# Patient Record
Sex: FEMALE | Race: BLACK OR AFRICAN AMERICAN | NOT HISPANIC OR LATINO | Employment: FULL TIME | URBAN - METROPOLITAN AREA
[De-identification: names, ages, dates, MRNs, and addresses within clinical notes are randomized per-mention and may not be internally consistent; named-entity substitution may affect disease eponyms.]

---

## 2017-05-22 ENCOUNTER — OFFICE VISIT (OUTPATIENT)
Dept: FAMILY MEDICINE CLINIC | Facility: CLINIC | Age: 47
End: 2017-05-22

## 2017-05-22 ENCOUNTER — HOSPITAL ENCOUNTER (OUTPATIENT)
Dept: GENERAL RADIOLOGY | Facility: HOSPITAL | Age: 47
Discharge: HOME | End: 2017-05-22
Admitting: NURSE PRACTITIONER

## 2017-05-22 VITALS
SYSTOLIC BLOOD PRESSURE: 140 MMHG | OXYGEN SATURATION: 99 % | HEIGHT: 69 IN | BODY MASS INDEX: 32.88 KG/M2 | HEART RATE: 87 BPM | DIASTOLIC BLOOD PRESSURE: 102 MMHG | WEIGHT: 222 LBS

## 2017-05-22 DIAGNOSIS — M53.3 PAIN IN THE COCCYX: Primary | ICD-10-CM

## 2017-05-22 DIAGNOSIS — I10 ESSENTIAL HYPERTENSION: ICD-10-CM

## 2017-05-22 DIAGNOSIS — M53.3 PAIN IN THE COCCYX: ICD-10-CM

## 2017-05-22 PROCEDURE — 99203 OFFICE O/P NEW LOW 30 MIN: CPT | Performed by: NURSE PRACTITIONER

## 2017-05-22 PROCEDURE — 72220 X-RAY EXAM SACRUM TAILBONE: CPT

## 2017-05-22 RX ORDER — LISINOPRIL AND HYDROCHLOROTHIAZIDE 20; 12.5 MG/1; MG/1
1 TABLET ORAL DAILY
Qty: 30 TABLET | Refills: 1 | Status: SHIPPED | OUTPATIENT
Start: 2017-05-22 | End: 2017-06-29

## 2017-06-22 ENCOUNTER — OFFICE VISIT (OUTPATIENT)
Dept: FAMILY MEDICINE CLINIC | Facility: CLINIC | Age: 47
End: 2017-06-22

## 2017-06-22 VITALS
DIASTOLIC BLOOD PRESSURE: 88 MMHG | WEIGHT: 217 LBS | HEIGHT: 69 IN | HEART RATE: 75 BPM | SYSTOLIC BLOOD PRESSURE: 138 MMHG | OXYGEN SATURATION: 99 % | BODY MASS INDEX: 32.14 KG/M2

## 2017-06-22 DIAGNOSIS — A60.09 HERPES GENITALIS IN WOMEN: ICD-10-CM

## 2017-06-22 DIAGNOSIS — I10 ESSENTIAL HYPERTENSION: ICD-10-CM

## 2017-06-22 DIAGNOSIS — Z00.00 ROUTINE ADULT HEALTH MAINTENANCE: Primary | ICD-10-CM

## 2017-06-22 LAB
ALBUMIN SERPL-MCNC: 3.9 G/DL (ref 3.5–5.2)
ALBUMIN/GLOB SERPL: 1.1 G/DL
ALP SERPL-CCNC: 87 U/L (ref 39–117)
ALT SERPL-CCNC: 21 U/L (ref 1–33)
AST SERPL-CCNC: 18 U/L (ref 1–32)
BASOPHILS # BLD AUTO: 0.02 10*3/MM3 (ref 0–0.2)
BASOPHILS NFR BLD AUTO: 0.3 % (ref 0–1.5)
BILIRUB SERPL-MCNC: 0.2 MG/DL (ref 0.1–1.2)
BUN SERPL-MCNC: 8 MG/DL (ref 6–20)
BUN/CREAT SERPL: 11.3 (ref 7–25)
CALCIUM SERPL-MCNC: 9.8 MG/DL (ref 8.6–10.5)
CHLORIDE SERPL-SCNC: 102 MMOL/L (ref 98–107)
CHOLEST SERPL-MCNC: 246 MG/DL (ref 0–200)
CO2 SERPL-SCNC: 26 MMOL/L (ref 22–29)
CREAT SERPL-MCNC: 0.71 MG/DL (ref 0.57–1)
EOSINOPHIL # BLD AUTO: 0.27 10*3/MM3 (ref 0–0.7)
EOSINOPHIL NFR BLD AUTO: 3.8 % (ref 0.3–6.2)
ERYTHROCYTE [DISTWIDTH] IN BLOOD BY AUTOMATED COUNT: 14.7 % (ref 11.7–13)
GLOBULIN SER CALC-MCNC: 3.7 GM/DL
GLUCOSE SERPL-MCNC: 107 MG/DL (ref 65–99)
HBA1C MFR BLD: 6.3 % (ref 4.8–5.6)
HCT VFR BLD AUTO: 38.9 % (ref 35.6–45.5)
HDLC SERPL-MCNC: 36 MG/DL (ref 40–60)
HGB BLD-MCNC: 12.8 G/DL (ref 11.9–15.5)
IMM GRANULOCYTES # BLD: 0 10*3/MM3 (ref 0–0.03)
IMM GRANULOCYTES NFR BLD: 0 % (ref 0–0.5)
LDLC SERPL CALC-MCNC: 177 MG/DL (ref 0–100)
LDLC/HDLC SERPL: 4.92 {RATIO}
LYMPHOCYTES # BLD AUTO: 2.42 10*3/MM3 (ref 0.9–4.8)
LYMPHOCYTES NFR BLD AUTO: 34.1 % (ref 19.6–45.3)
MCH RBC QN AUTO: 27.8 PG (ref 26.9–32)
MCHC RBC AUTO-ENTMCNC: 32.9 G/DL (ref 32.4–36.3)
MCV RBC AUTO: 84.4 FL (ref 80.5–98.2)
MONOCYTES # BLD AUTO: 0.9 10*3/MM3 (ref 0.2–1.2)
MONOCYTES NFR BLD AUTO: 12.7 % (ref 5–12)
NEUTROPHILS # BLD AUTO: 3.48 10*3/MM3 (ref 1.9–8.1)
NEUTROPHILS NFR BLD AUTO: 49.1 % (ref 42.7–76)
PLATELET # BLD AUTO: 422 10*3/MM3 (ref 140–500)
POTASSIUM SERPL-SCNC: 4.3 MMOL/L (ref 3.5–5.2)
PROT SERPL-MCNC: 7.6 G/DL (ref 6–8.5)
RBC # BLD AUTO: 4.61 10*6/MM3 (ref 3.9–5.2)
SODIUM SERPL-SCNC: 140 MMOL/L (ref 136–145)
TRIGL SERPL-MCNC: 165 MG/DL (ref 0–150)
TSH SERPL DL<=0.005 MIU/L-ACNC: 1.13 MIU/ML (ref 0.27–4.2)
VLDLC SERPL CALC-MCNC: 33 MG/DL (ref 5–40)
WBC # BLD AUTO: 7.09 10*3/MM3 (ref 4.5–10.7)

## 2017-06-22 PROCEDURE — 99396 PREV VISIT EST AGE 40-64: CPT | Performed by: NURSE PRACTITIONER

## 2017-06-22 RX ORDER — VALACYCLOVIR HYDROCHLORIDE 500 MG/1
500 TABLET, FILM COATED ORAL DAILY
Qty: 90 TABLET | Refills: 3 | Status: SHIPPED | OUTPATIENT
Start: 2017-06-22 | End: 2020-07-09 | Stop reason: SDUPTHER

## 2017-06-22 NOTE — PROGRESS NOTES
"Preventive Exam    History of Present Illness: Jacqueline is here for check up and review of routine health maintenance. She states she is doing well and has no concerns. Pt was started on lisinopril and took 5 days of the medication and wants to lose weight and work on diet and exercise. Smoking 5 cigg a day. Pt has lost 5 pounds and is doing weight watchers.   Pt has a history of genital herpes and wants to take daily medication.     REVIEW OF SYSTEMS  Constitutional: Negative.    HENT: Negative.    Eyes: Negative.    Respiratory: Negative.    Cardiovascular: Negative.    Gastrointestinal: Negative.    Endocrine: Negative.    Genitourinary: Negative.    Musculoskeletal: Negative.  Skin: Negative.    Allergic/Immunologic: Negative.    Neurological: Negative.    Hematological: Negative.    Psychiatric/Behavioral: Negative.    All other systems reviewed and are negative.          PHYSICAL EXAM    Vitals:    06/22/17 1257   BP: 138/88   Pulse: 75   SpO2: 99%   Weight: 217 lb (98.4 kg)   Height: 69\" (175.3 cm)     GENERAL: alert and oriented, afebrile and vital signs stable  HEENT: oral mucosa moist, PEERLA, EOM, conjunctiva normal  No cervical adenopathy  LUNGS: clear to ascultation bilaterally, no rales, ronchi or wheezing  HEART: RRR S1 S2 without murmers, thrills, rubs or gallops  CHEST WALL: within normal limits, no tenderness  BREAST EXAM: No masses, skin changes, tenderness or discharge noted  ABDOMEN: WNL. Normal BS.  EXTREMITIES: No clubbing, cyanosis or edema noted. Normal Pulses.  SKIN: warm, dry, no rashes noted  NEURO: CN II- XII grossly intact    ASSESSMENT AND PLAN  Problem List Items Addressed This Visit     None      Visit Diagnoses     Routine adult health maintenance    -  Primary    Relevant Orders    CBC & Differential    Comprehensive Metabolic Panel    Lipid Panel With LDL / HDL Ratio    TSH    Hemoglobin A1c    Mammo Screening Bilateral With CAD    Essential hypertension        Herpes genitalis in " women        Relevant Medications    valACYclovir (VALTREX) 500 MG tablet        Routine health maintenance reviewed and discussed with Jacqueline.    .  Orders Placed This Encounter   Procedures   • Mammo Screening Bilateral With CAD     Order Specific Question:   Reason for Exam:     Answer:   screening     Order Specific Question:   Patient Pregnant     Answer:   No   • Comprehensive Metabolic Panel   • Lipid Panel With LDL / HDL Ratio   • TSH   • Hemoglobin A1c   • CBC & Differential     No Follow-up on file.

## 2017-06-27 ENCOUNTER — TELEPHONE (OUTPATIENT)
Dept: FAMILY MEDICINE CLINIC | Facility: CLINIC | Age: 47
End: 2017-06-27

## 2017-06-29 ENCOUNTER — OFFICE VISIT (OUTPATIENT)
Dept: FAMILY MEDICINE CLINIC | Facility: CLINIC | Age: 47
End: 2017-06-29

## 2017-06-29 VITALS
SYSTOLIC BLOOD PRESSURE: 140 MMHG | DIASTOLIC BLOOD PRESSURE: 98 MMHG | HEIGHT: 69 IN | OXYGEN SATURATION: 99 % | HEART RATE: 65 BPM | WEIGHT: 213 LBS | BODY MASS INDEX: 31.55 KG/M2

## 2017-06-29 DIAGNOSIS — F32.9 REACTIVE DEPRESSION: ICD-10-CM

## 2017-06-29 DIAGNOSIS — R55 SYNCOPE AND COLLAPSE: Primary | ICD-10-CM

## 2017-06-29 DIAGNOSIS — Z09 HOSPITAL DISCHARGE FOLLOW-UP: ICD-10-CM

## 2017-06-29 DIAGNOSIS — F41.9 ANXIETY: ICD-10-CM

## 2017-06-29 PROCEDURE — 99213 OFFICE O/P EST LOW 20 MIN: CPT | Performed by: NURSE PRACTITIONER

## 2017-06-29 RX ORDER — HYDROCHLOROTHIAZIDE 25 MG/1
TABLET ORAL
Refills: 0 | COMMUNITY
Start: 2017-06-25 | End: 2018-03-28

## 2017-06-29 NOTE — PATIENT INSTRUCTIONS
Orthostatic Hypotension  Orthostatic hypotension is a sudden drop in blood pressure. It happens when you quickly stand up from a seated or lying position. You may feel dizzy or light-headed. This can last for just a few seconds or for up to a few minutes. It is usually not a serious problem. However, if this happens frequently or gets worse, it can be a sign of something more serious.  CAUSES   Different things can cause orthostatic hypotension, including:   · Loss of body fluids (dehydration).  · Medicines that lower blood pressure.  · Sudden changes in posture, such as standing up quickly after you have been sitting or lying down.  · Taking too much of your medicine.  SIGNS AND SYMPTOMS   · Light-headedness or dizziness.    · Fainting or near-fainting.    · A fast heart rate.    · Weakness.    · Feeling tired (fatigue).    DIAGNOSIS   Your health care provider may do several things to help diagnose your condition and identify the cause. These may include:   · Taking a medical history and doing a physical exam.  · Checking your blood pressure. Your health care provider will check your blood pressure when you are:    Lying down.    Sitting.    Standing.  · Using tilt table testing. In this test, you lie down on a table that moves from a lying position to a standing position. You will be strapped onto the table. This test monitors your blood pressure and heart rate when you are in different positions.  TREATMENT   Treatment will vary depending on the cause. Possible treatments include:   · Changing the dosage of your medicines.   · Wearing compression stockings on your lower legs.  · Standing up slowly after sitting or lying down.  · Eating more salt.  · Eating frequent, small meals.  · In some cases, getting IV fluids.  · Taking medicine to enhance fluid retention.  HOME CARE INSTRUCTIONS  · Only take over-the-counter or prescription medicines as directed by your health care provider.    Follow your health care  provider's instructions for changing the dosage of your current medicines.    Do not stop or adjust your medicine on your own.  · Stand up slowly after sitting or lying down. This allows your body to adjust to the different position.  · Wear compression stockings as directed.  · Eat extra salt as directed.    Do not add extra salt to your diet unless directed to by your health care provider.  · Eat frequent, small meals.  · Avoid standing suddenly after eating.  · Avoid hot showers or excessive heat as directed by your health care provider.  · Keep all follow-up appointments.  SEEK MEDICAL CARE IF:  · You continue to feel dizzy or light-headed after standing.  · You feel groggy or confused.  · You feel cold, clammy, or sick to your stomach (nauseous).  · You have blurred vision.  · You feel short of breath.  SEEK IMMEDIATE MEDICAL CARE IF:   · You faint after standing.  · You have chest pain.   · You have difficulty breathing.    · You lose feeling or movement in your arms or legs.    · You have slurred speech or difficulty talking, or you are unable to talk.    MAKE SURE YOU:   · Understand these instructions.  · Will watch your condition.  · Will get help right away if you are not doing well or get worse.     This information is not intended to replace advice given to you by your health care provider. Make sure you discuss any questions you have with your health care provider.     Document Released: 12/08/2003 Document Revised: 04/10/2017 Document Reviewed: 10/10/2014  [employer] Interactive Patient Education ©2017 [employer]

## 2017-06-29 NOTE — PROGRESS NOTES
Jacqueline Mcdowell is a 46 y.o. female.Patient states that last Saturday she went to [facility], she started feeling like she needed a rest and while she was walking away she passed out. She had not drank anything that day. She was seen in the ER. She was dx with dehydration. Patient states that she does have intermittent dizzy spells. Patient has not been taking Lisinopril.     Reviewed records from hospital admission and pt was discharged with normal echocardiogram and started on HCTZ. Pt is tearful and admits to relationship problems with her long term boyfriend.   Records from hospital admission reviewed. Pt is taking the HCTZ and has not had any dizziness since admission.   Patient also states that she has had depression since May. Denies and SI/HI.   Seen 06/29/2017    Assessment/Plan   Problem List Items Addressed This Visit     None      Visit Diagnoses     Syncope and collapse    -  Primary    Hospital discharge follow-up        Anxiety        Relevant Orders    Ambulatory Referral to Psychiatry    Reactive depression                 No Follow-up on file.  Patient Instructions   Orthostatic Hypotension  Orthostatic hypotension is a sudden drop in blood pressure. It happens when you quickly stand up from a seated or lying position. You may feel dizzy or light-headed. This can last for just a few seconds or for up to a few minutes. It is usually not a serious problem. However, if this happens frequently or gets worse, it can be a sign of something more serious.  CAUSES   Different things can cause orthostatic hypotension, including:   · Loss of body fluids (dehydration).  · Medicines that lower blood pressure.  · Sudden changes in posture, such as standing up quickly after you have been sitting or lying down.  · Taking too much of your medicine.  SIGNS AND SYMPTOMS   · Light-headedness or dizziness.    · Fainting or near-fainting.    · A fast heart rate.    · Weakness.    · Feeling tired (fatigue).    DIAGNOSIS   Your  health care provider may do several things to help diagnose your condition and identify the cause. These may include:   · Taking a medical history and doing a physical exam.  · Checking your blood pressure. Your health care provider will check your blood pressure when you are:    Lying down.    Sitting.    Standing.  · Using tilt table testing. In this test, you lie down on a table that moves from a lying position to a standing position. You will be strapped onto the table. This test monitors your blood pressure and heart rate when you are in different positions.  TREATMENT   Treatment will vary depending on the cause. Possible treatments include:   · Changing the dosage of your medicines.   · Wearing compression stockings on your lower legs.  · Standing up slowly after sitting or lying down.  · Eating more salt.  · Eating frequent, small meals.  · In some cases, getting IV fluids.  · Taking medicine to enhance fluid retention.  HOME CARE INSTRUCTIONS  · Only take over-the-counter or prescription medicines as directed by your health care provider.    Follow your health care provider's instructions for changing the dosage of your current medicines.    Do not stop or adjust your medicine on your own.  · Stand up slowly after sitting or lying down. This allows your body to adjust to the different position.  · Wear compression stockings as directed.  · Eat extra salt as directed.    Do not add extra salt to your diet unless directed to by your health care provider.  · Eat frequent, small meals.  · Avoid standing suddenly after eating.  · Avoid hot showers or excessive heat as directed by your health care provider.  · Keep all follow-up appointments.  SEEK MEDICAL CARE IF:  · You continue to feel dizzy or light-headed after standing.  · You feel groggy or confused.  · You feel cold, clammy, or sick to your stomach (nauseous).  · You have blurred vision.  · You feel short of breath.  SEEK IMMEDIATE MEDICAL CARE IF:  "  · You faint after standing.  · You have chest pain.   · You have difficulty breathing.    · You lose feeling or movement in your arms or legs.    · You have slurred speech or difficulty talking, or you are unable to talk.    MAKE SURE YOU:   · Understand these instructions.  · Will watch your condition.  · Will get help right away if you are not doing well or get worse.     This information is not intended to replace advice given to you by your health care provider. Make sure you discuss any questions you have with your health care provider.     Document Released: 12/08/2003 Document Revised: 04/10/2017 Document Reviewed: 10/10/2014  [employer] Interactive Patient Education ©2017 [employer]        Vitals:    06/29/17 1100   BP: 140/98   Pulse: 65   SpO2: 99%   Weight: 213 lb (96.6 kg)   Height: 69\" (175.3 cm)     Body mass index is 31.45 kg/(m^2).    Subjective     Chief Complaint   Patient presents with   • Depression   • Follow-up     Social History   Substance Use Topics   • Smoking status: Current Every Day Smoker     Packs/day: 0.25     Start date: 4/15/2017   • Smokeless tobacco: Not on file   • Alcohol use No       History of Present Illness     The following portions of the patient's history were reviewed and updated as appropriate:PMHroutine: Social history , Allergies, Current Medications, Active Problem List and Health Maintenance    Review of Systems   Neurological: Positive for dizziness.       Objective   Physical Exam   Constitutional: She is oriented to person, place, and time. She appears well-developed and well-nourished. No distress.   HENT:   Head: Normocephalic and atraumatic.   Right Ear: External ear normal.   Left Ear: External ear normal.   Nose: Nose normal.   Eyes: Conjunctivae and EOM are normal. Pupils are equal, round, and reactive to light.   Neck: Normal range of motion. Neck supple.   Cardiovascular: Normal rate and regular rhythm.    No murmur heard.  Pulmonary/Chest: Effort " normal and breath sounds normal.   Musculoskeletal: Normal range of motion.   Lymphadenopathy:     She has no cervical adenopathy.   Neurological: She is alert and oriented to person, place, and time. No cranial nerve deficit. Coordination normal.   Skin: Skin is warm and dry.   Psychiatric: She has a normal mood and affect. Her behavior is normal. Judgment and thought content normal.   Nursing note and vitals reviewed.    Reviewed Data:  Office Visit on 06/22/2017   Component Date Value Ref Range Status   • WBC 06/22/2017 7.09  4.50 - 10.70 10*3/mm3 Final   • RBC 06/22/2017 4.61  3.90 - 5.20 10*6/mm3 Final   • Hemoglobin 06/22/2017 12.8  11.9 - 15.5 g/dL Final   • Hematocrit 06/22/2017 38.9  35.6 - 45.5 % Final   • MCV 06/22/2017 84.4  80.5 - 98.2 fL Final   • MCH 06/22/2017 27.8  26.9 - 32.0 pg Final   • MCHC 06/22/2017 32.9  32.4 - 36.3 g/dL Final   • RDW 06/22/2017 14.7* 11.7 - 13.0 % Final   • Platelets 06/22/2017 422  140 - 500 10*3/mm3 Final   • Neutrophil Rel % 06/22/2017 49.1  42.7 - 76.0 % Final   • Lymphocyte Rel % 06/22/2017 34.1  19.6 - 45.3 % Final   • Monocyte Rel % 06/22/2017 12.7* 5.0 - 12.0 % Final   • Eosinophil Rel % 06/22/2017 3.8  0.3 - 6.2 % Final   • Basophil Rel % 06/22/2017 0.3  0.0 - 1.5 % Final   • Neutrophils Absolute 06/22/2017 3.48  1.90 - 8.10 10*3/mm3 Final   • Lymphocytes Absolute 06/22/2017 2.42  0.90 - 4.80 10*3/mm3 Final   • Monocytes Absolute 06/22/2017 0.90  0.20 - 1.20 10*3/mm3 Final   • Eosinophils Absolute 06/22/2017 0.27  0.00 - 0.70 10*3/mm3 Final   • Basophils Absolute 06/22/2017 0.02  0.00 - 0.20 10*3/mm3 Final   • Immature Granulocyte Rel % 06/22/2017 0.0  0.0 - 0.5 % Final   • Immature Grans Absolute 06/22/2017 0.00  0.00 - 0.03 10*3/mm3 Final   • Glucose 06/22/2017 107* 65 - 99 mg/dL Final   • BUN 06/22/2017 8  6 - 20 mg/dL Final   • Creatinine 06/22/2017 0.71  0.57 - 1.00 mg/dL Final   • eGFR Non  Am 06/22/2017 89  >60 mL/min/1.73 Final   • eGFR African Am  06/22/2017 108  >60 mL/min/1.73 Final   • BUN/Creatinine Ratio 06/22/2017 11.3  7.0 - 25.0 Final   • Sodium 06/22/2017 140  136 - 145 mmol/L Final   • Potassium 06/22/2017 4.3  3.5 - 5.2 mmol/L Final   • Chloride 06/22/2017 102  98 - 107 mmol/L Final   • Total CO2 06/22/2017 26.0  22.0 - 29.0 mmol/L Final   • Calcium 06/22/2017 9.8  8.6 - 10.5 mg/dL Final   • Total Protein 06/22/2017 7.6  6.0 - 8.5 g/dL Final   • Albumin 06/22/2017 3.90  3.50 - 5.20 g/dL Final   • Globulin 06/22/2017 3.7  gm/dL Final   • A/G Ratio 06/22/2017 1.1  g/dL Final   • Total Bilirubin 06/22/2017 0.2  0.1 - 1.2 mg/dL Final   • Alkaline Phosphatase 06/22/2017 87  39 - 117 U/L Final   • AST (SGOT) 06/22/2017 18  1 - 32 U/L Final   • ALT (SGPT) 06/22/2017 21  1 - 33 U/L Final   • Total Cholesterol 06/22/2017 246* 0 - 200 mg/dL Final   • Triglycerides 06/22/2017 165* 0 - 150 mg/dL Final   • HDL Cholesterol 06/22/2017 36* 40 - 60 mg/dL Final   • VLDL Cholesterol 06/22/2017 33  5 - 40 mg/dL Final   • LDL Cholesterol  06/22/2017 177* 0 - 100 mg/dL Final   • LDL/HDL Ratio 06/22/2017 4.92   Final   • TSH 06/22/2017 1.130  0.270 - 4.200 mIU/mL Final   • Hemoglobin A1C 06/22/2017 6.30* 4.80 - 5.60 % Final    Comment: Hemoglobin A1C Ranges:  Increased Risk for Diabetes  5.7% to 6.4%  Diabetes                     >= 6.5%  Diabetic Goal                < 7.0%

## 2018-01-11 DIAGNOSIS — I10 ESSENTIAL HYPERTENSION: ICD-10-CM

## 2018-01-11 RX ORDER — LISINOPRIL AND HYDROCHLOROTHIAZIDE 20; 12.5 MG/1; MG/1
TABLET ORAL
Qty: 30 TABLET | Refills: 0 | Status: SHIPPED | OUTPATIENT
Start: 2018-01-11 | End: 2020-07-09

## 2018-03-28 ENCOUNTER — OFFICE VISIT (OUTPATIENT)
Dept: FAMILY MEDICINE CLINIC | Facility: CLINIC | Age: 48
End: 2018-03-28

## 2018-03-28 VITALS
DIASTOLIC BLOOD PRESSURE: 90 MMHG | OXYGEN SATURATION: 98 % | TEMPERATURE: 100 F | SYSTOLIC BLOOD PRESSURE: 120 MMHG | BODY MASS INDEX: 31.1 KG/M2 | HEART RATE: 96 BPM | HEIGHT: 69 IN | WEIGHT: 210 LBS

## 2018-03-28 DIAGNOSIS — J10.1 INFLUENZA B: Primary | ICD-10-CM

## 2018-03-28 LAB
EXPIRATION DATE: NORMAL
FLUAV AG NPH QL: NEGATIVE
FLUBV AG NPH QL: POSITIVE
INTERNAL CONTROL: NORMAL
Lab: NORMAL

## 2018-03-28 PROCEDURE — 87804 INFLUENZA ASSAY W/OPTIC: CPT | Performed by: NURSE PRACTITIONER

## 2018-03-28 PROCEDURE — 99213 OFFICE O/P EST LOW 20 MIN: CPT | Performed by: NURSE PRACTITIONER

## 2018-03-28 RX ORDER — OSELTAMIVIR PHOSPHATE 75 MG/1
75 CAPSULE ORAL 2 TIMES DAILY
Qty: 10 CAPSULE | Refills: 0 | Status: SHIPPED | OUTPATIENT
Start: 2018-03-28 | End: 2018-04-02

## 2018-03-28 RX ORDER — DEXTROMETHORPHAN HYDROBROMIDE AND PROMETHAZINE HYDROCHLORIDE 15; 6.25 MG/5ML; MG/5ML
5 SYRUP ORAL 4 TIMES DAILY PRN
Qty: 118 ML | Refills: 0 | Status: SHIPPED | OUTPATIENT
Start: 2018-03-28 | End: 2020-07-09

## 2018-03-28 NOTE — PATIENT INSTRUCTIONS
OFF WORK 1 WEEK        Influenza, Adult  Influenza, more commonly known as “the flu,” is a viral infection that primarily affects the respiratory tract. The respiratory tract includes organs that help you breathe, such as the lungs, nose, and throat. The flu causes many common cold symptoms, as well as a high fever and body aches.  The flu spreads easily from person to person (is contagious). Getting a flu shot (influenza vaccination) every year is the best way to prevent influenza.  What are the causes?  Influenza is caused by a virus. You can catch the virus by:  · Breathing in droplets from an infected person's cough or sneeze.  · Touching something that was recently contaminated with the virus and then touching your mouth, nose, or eyes.  What increases the risk?  The following factors may make you more likely to get the flu:  · Not cleaning your hands frequently with soap and water or alcohol-based hand .  · Having close contact with many people during cold and flu season.  · Touching your mouth, eyes, or nose without washing or sanitizing your hands first.  · Not drinking enough fluids or not eating a healthy diet.  · Not getting enough sleep or exercise.  · Being under a high amount of stress.  · Not getting a yearly (annual) flu shot.  You may be at a higher risk of complications from the flu, such as a severe lung infection (pneumonia), if you:  · Are over the age of 65.  · Are pregnant.  · Have a weakened disease-fighting system (immune system). You may have a weakened immune system if you:  ¨ Have HIV or AIDS.  ¨ Are undergoing chemotherapy.  ¨ Are taking medicines that reduce the activity of (suppress) the immune system.  · Have a long-term (chronic) illness, such as heart disease, kidney disease, diabetes, or lung disease.  · Have a liver disorder.  · Are obese.  · Have anemia.  What are the signs or symptoms?  Symptoms of this condition typically last 4-10 days and may  include:  · Fever.  · Chills.  · Headache, body aches, or muscle aches.  · Sore throat.  · Cough.  · Runny or congested nose.  · Chest discomfort and cough.  · Poor appetite.  · Weakness or tiredness (fatigue).  · Dizziness.  · Nausea or vomiting.  How is this diagnosed?  This condition may be diagnosed based on your medical history and a physical exam. Your health care provider may do a nose or throat swab test to confirm the diagnosis.  How is this treated?  If influenza is detected early, you can be treated with antiviral medicine that can reduce the length of your illness and the severity of your symptoms. This medicine may be given by mouth (orally) or through an IV tube that is inserted in one of your veins.  The goal of treatment is to relieve symptoms by taking care of yourself at home. This may include taking over-the-counter medicines, drinking plenty of fluids, and adding humidity to the air in your home.  In some cases, influenza goes away on its own. Severe influenza or complications from influenza may be treated in a hospital.  Follow these instructions at home:  · Take over-the-counter and prescription medicines only as told by your health care provider.  · Use a cool mist humidifier to add humidity to the air in your home. This can make breathing easier.  · Rest as needed.  · Drink enough fluid to keep your urine clear or pale yellow.  · Cover your mouth and nose when you cough or sneeze.  · Wash your hands with soap and water often, especially after you cough or sneeze. If soap and water are not available, use hand .  · Stay home from work or school as told by your health care provider. Unless you are visiting your health care provider, try to avoid leaving home until your fever has been gone for 24 hours without the use of medicine.  · Keep all follow-up visits as told by your health care provider. This is important.  How is this prevented?  · Getting an annual flu shot is the best way to  avoid getting the flu. You may get the flu shot in late summer, fall, or winter. Ask your health care provider when you should get your flu shot.  · Wash your hands often or use hand  often.  · Avoid contact with people who are sick during cold and flu season.  · Eat a healthy diet, drink plenty of fluids, get enough sleep, and exercise regularly.  Contact a health care provider if:  · You develop new symptoms.  · You have:  ¨ Chest pain.  ¨ Diarrhea.  ¨ A fever.  · Your cough gets worse.  · You produce more mucus.  · You feel nauseous or you vomit.  Get help right away if:  · You develop shortness of breath or difficulty breathing.  · Your skin or nails turn a bluish color.  · You have severe pain or stiffness in your neck.  · You develop a sudden headache or sudden pain in your face or ear.  · You cannot stop vomiting.  This information is not intended to replace advice given to you by your health care provider. Make sure you discuss any questions you have with your health care provider.  Document Released: 12/15/2001 Document Revised: 05/25/2017 Document Reviewed: 10/11/2016  [employer] Interactive Patient Education © 2017 [employer]

## 2018-03-28 NOTE — PROGRESS NOTES
Jcaqueline Mcdowell is a 47 y.o. female.Patient states that for one day has had body aches, sore throat, headache and chills.    Jacqueline Mcdowell is a 47 y.o. female presenting with the following symptoms:  Fever: present, moderate, 101-102+  Headache: yes  Cough: yes  Shortness of breath: no  Myalgias: yes  Vomiting: no  Diarrhea: no  Has been ill for day(s)+2  The patient had a flu shot this year: no    Additional history no history of pneumonia or asthma.    This patient has the following considerations for Tamiflu: has selected    Assessment/Plan   Problem List Items Addressed This Visit     None      Visit Diagnoses     Influenza B    -  Primary    Relevant Medications    oseltamivir (TAMIFLU) 75 MG capsule    promethazine-dextromethorphan (PROMETHAZINE-DM) 6.25-15 MG/5ML syrup    Other Relevant Orders    POCT Influenza A/B (Completed)             Return for Annual.  Patient Instructions   OFF WORK 1 WEEK        Influenza, Adult  Influenza, more commonly known as “the flu,” is a viral infection that primarily affects the respiratory tract. The respiratory tract includes organs that help you breathe, such as the lungs, nose, and throat. The flu causes many common cold symptoms, as well as a high fever and body aches.  The flu spreads easily from person to person (is contagious). Getting a flu shot (influenza vaccination) every year is the best way to prevent influenza.  What are the causes?  Influenza is caused by a virus. You can catch the virus by:  · Breathing in droplets from an infected person's cough or sneeze.  · Touching something that was recently contaminated with the virus and then touching your mouth, nose, or eyes.  What increases the risk?  The following factors may make you more likely to get the flu:  · Not cleaning your hands frequently with soap and water or alcohol-based hand .  · Having close contact with many people during cold and flu season.  · Touching your mouth, eyes, or nose without  washing or sanitizing your hands first.  · Not drinking enough fluids or not eating a healthy diet.  · Not getting enough sleep or exercise.  · Being under a high amount of stress.  · Not getting a yearly (annual) flu shot.  You may be at a higher risk of complications from the flu, such as a severe lung infection (pneumonia), if you:  · Are over the age of 65.  · Are pregnant.  · Have a weakened disease-fighting system (immune system). You may have a weakened immune system if you:  ¨ Have HIV or AIDS.  ¨ Are undergoing chemotherapy.  ¨ Are taking medicines that reduce the activity of (suppress) the immune system.  · Have a long-term (chronic) illness, such as heart disease, kidney disease, diabetes, or lung disease.  · Have a liver disorder.  · Are obese.  · Have anemia.  What are the signs or symptoms?  Symptoms of this condition typically last 4-10 days and may include:  · Fever.  · Chills.  · Headache, body aches, or muscle aches.  · Sore throat.  · Cough.  · Runny or congested nose.  · Chest discomfort and cough.  · Poor appetite.  · Weakness or tiredness (fatigue).  · Dizziness.  · Nausea or vomiting.  How is this diagnosed?  This condition may be diagnosed based on your medical history and a physical exam. Your health care provider may do a nose or throat swab test to confirm the diagnosis.  How is this treated?  If influenza is detected early, you can be treated with antiviral medicine that can reduce the length of your illness and the severity of your symptoms. This medicine may be given by mouth (orally) or through an IV tube that is inserted in one of your veins.  The goal of treatment is to relieve symptoms by taking care of yourself at home. This may include taking over-the-counter medicines, drinking plenty of fluids, and adding humidity to the air in your home.  In some cases, influenza goes away on its own. Severe influenza or complications from influenza may be treated in a hospital.  Follow these  instructions at home:  · Take over-the-counter and prescription medicines only as told by your health care provider.  · Use a cool mist humidifier to add humidity to the air in your home. This can make breathing easier.  · Rest as needed.  · Drink enough fluid to keep your urine clear or pale yellow.  · Cover your mouth and nose when you cough or sneeze.  · Wash your hands with soap and water often, especially after you cough or sneeze. If soap and water are not available, use hand .  · Stay home from work or school as told by your health care provider. Unless you are visiting your health care provider, try to avoid leaving home until your fever has been gone for 24 hours without the use of medicine.  · Keep all follow-up visits as told by your health care provider. This is important.  How is this prevented?  · Getting an annual flu shot is the best way to avoid getting the flu. You may get the flu shot in late summer, fall, or winter. Ask your health care provider when you should get your flu shot.  · Wash your hands often or use hand  often.  · Avoid contact with people who are sick during cold and flu season.  · Eat a healthy diet, drink plenty of fluids, get enough sleep, and exercise regularly.  Contact a health care provider if:  · You develop new symptoms.  · You have:  ¨ Chest pain.  ¨ Diarrhea.  ¨ A fever.  · Your cough gets worse.  · You produce more mucus.  · You feel nauseous or you vomit.  Get help right away if:  · You develop shortness of breath or difficulty breathing.  · Your skin or nails turn a bluish color.  · You have severe pain or stiffness in your neck.  · You develop a sudden headache or sudden pain in your face or ear.  · You cannot stop vomiting.  This information is not intended to replace advice given to you by your health care provider. Make sure you discuss any questions you have with your health care provider.  Document Released: 12/15/2001 Document Revised:  "05/25/2017 Document Reviewed: 10/11/2016  [employer] Patient Education © 2017 [employer]        Chief Complaint   Patient presents with   • Generalized Body Aches     Social History   Substance Use Topics   • Smoking status: Current Every Day Smoker     Packs/day: 0.25     Start date: 4/15/2017   • Smokeless tobacco: Not on file   • Alcohol use No       History of Present Illness     The following portions of the patient's history were reviewed and updated as appropriate:PMHroutine: Social history , Allergies, Current Medications, Active Problem List and Health Maintenance    Review of Systems   Constitutional: Positive for fatigue.   HENT: Positive for sore throat.    Musculoskeletal: Positive for myalgias.   Neurological: Positive for weakness and headaches.       Objective   Vitals:    03/28/18 1112   BP: 120/90   Pulse: 96   Temp: 100 °F (37.8 °C)   SpO2: 98%   Weight: 95.3 kg (210 lb)   Height: 175.3 cm (69\")     Body mass index is 31.01 kg/m².  Physical Exam   Constitutional: She is oriented to person, place, and time. She appears well-developed and well-nourished. No distress.   HENT:   Head: Normocephalic and atraumatic.   Right Ear: External ear normal.   Left Ear: External ear normal.   Nose: Nose normal.   Mouth/Throat: Oropharynx is clear and moist. No oropharyngeal exudate.   Eyes: Conjunctivae and EOM are normal. Pupils are equal, round, and reactive to light.   Neck: Normal range of motion.   Cardiovascular: Normal rate and regular rhythm.    Pulmonary/Chest: Effort normal and breath sounds normal. No stridor. No respiratory distress. She has no wheezes.   Lymphadenopathy:     She has no cervical adenopathy.   Neurological: She is alert and oriented to person, place, and time.   Skin: Skin is warm and dry.   Psychiatric: She has a normal mood and affect. Her behavior is normal. Judgment and thought content normal.   Nursing note and vitals reviewed.    Reviewed Data:  Office Visit on " 03/28/2018   Component Date Value Ref Range Status   • Rapid Influenza A Ag 03/28/2018 negative   Final   • Rapid Influenza B Ag 03/28/2018 positive   Final   • Internal Control 03/28/2018 Passed  Passed Final   • Lot Number 03/28/2018 [id number]   Final   • Expiration Date 03/28/2018 [id number]   Final

## 2020-07-09 ENCOUNTER — OFFICE VISIT (OUTPATIENT)
Dept: FAMILY MEDICINE CLINIC | Facility: CLINIC | Age: 50
End: 2020-07-09

## 2020-07-09 VITALS
TEMPERATURE: 97.1 F | RESPIRATION RATE: 15 BRPM | WEIGHT: 215 LBS | HEIGHT: 69 IN | OXYGEN SATURATION: 98 % | BODY MASS INDEX: 31.84 KG/M2 | DIASTOLIC BLOOD PRESSURE: 80 MMHG | HEART RATE: 100 BPM | SYSTOLIC BLOOD PRESSURE: 140 MMHG

## 2020-07-09 DIAGNOSIS — A60.09 HERPES GENITALIS IN WOMEN: ICD-10-CM

## 2020-07-09 DIAGNOSIS — B02.9 HERPES ZOSTER WITHOUT COMPLICATION: Primary | ICD-10-CM

## 2020-07-09 PROCEDURE — 99213 OFFICE O/P EST LOW 20 MIN: CPT | Performed by: NURSE PRACTITIONER

## 2020-07-09 RX ORDER — VALACYCLOVIR HYDROCHLORIDE 500 MG/1
500 TABLET, FILM COATED ORAL DAILY
Qty: 90 TABLET | Refills: 1 | Status: SHIPPED | OUTPATIENT
Start: 2020-07-09 | End: 2021-07-02

## 2020-07-09 NOTE — PATIENT INSTRUCTIONS
Return in about 1 month (around 8/9/2020) for Annual, Labs.    Shingles    Shingles, which is also known as herpes zoster, is an infection that causes a painful skin rash and fluid-filled blisters. It is caused by a virus.  Shingles only develops in people who:  · Have had chickenpox.  · Have been given a medicine to protect against chickenpox (have been vaccinated). Shingles is rare in this group.  What are the causes?  Shingles is caused by varicella-zoster virus (VZV). This is the same virus that causes chickenpox. After a person is exposed to VZV, the virus stays in the body in an inactive (dormant) state. Shingles develops if the virus is reactivated. This can happen many years after the first (initial) exposure to VZV. It is not known what causes this virus to be reactivated.  What increases the risk?  People who have had chickenpox or received the chickenpox vaccine are at risk for shingles. Shingles infection is more common in people who:  · Are older than age 60.  · Have a weakened disease-fighting system (immune system), such as people with:  ? HIV.  ? AIDS.  ? Cancer.  · Are taking medicines that weaken the immune system, such as transplant medicines.  · Are experiencing a lot of stress.  What are the signs or symptoms?  Early symptoms of this condition include itching, tingling, and pain in an area on your skin. Pain may be described as burning, stabbing, or throbbing.  A few days or weeks after early symptoms start, a painful red rash appears. The rash is usually on one side of the body and has a band-like or belt-like pattern. The rash eventually turns into fluid-filled blisters that break open, change into scabs, and dry up in about 2-3 weeks.  At any time during the infection, you may also develop:  · A fever.  · Chills.  · A headache.  · An upset stomach.  How is this diagnosed?  This condition is diagnosed with a skin exam. Skin or fluid samples may be taken from the blisters before a diagnosis is  made. These samples are examined under a microscope or sent to a lab for testing.  How is this treated?  The rash may last for several weeks. There is not a specific cure for this condition. Your health care provider will probably prescribe medicines to help you manage pain, recover more quickly, and avoid long-term problems. Medicines may include:  · Antiviral drugs.  · Anti-inflammatory drugs.  · Pain medicines.  · Anti-itching medicines (antihistamines).  If the area involved is on your face, you may be referred to a specialist, such as an eye doctor (ophthalmologist) or an ear, nose, and throat (ENT) doctor (otolaryngologist) to help you avoid eye problems, chronic pain, or disability.  Follow these instructions at home:  Medicines  · Take over-the-counter and prescription medicines only as told by your health care provider.  · Apply an anti-itch cream or numbing cream to the affected area as told by your health care provider.  Relieving itching and discomfort    · Apply cold, wet cloths (cold compresses) to the area of the rash or blisters as told by your health care provider.  · Cool baths can be soothing. Try adding baking soda or dry oatmeal to the water to reduce itching. Do not bathe in hot water.  Blister and rash care  · Keep your rash covered with a loose bandage (dressing). Wear loose-fitting clothing to help ease the pain of material rubbing against the rash.  · Keep your rash and blisters clean by washing the area with mild soap and cool water as told by your health care provider.  · Check your rash every day for signs of infection. Check for:  ? More redness, swelling, or pain.  ? Fluid or blood.  ? Warmth.  ? Pus or a bad smell.  · Do not scratch your rash or pick at your blisters. To help avoid scratching:  ? Keep your fingernails clean and cut short.  ? Wear gloves or mittens while you sleep, if scratching is a problem.  General instructions  · Rest as told by your health care provider.  · Keep  all follow-up visits as told by your health care provider. This is important.  · Wash your hands often with soap and water. If soap and water are not available, use hand . Doing this lowers your chance of getting a bacterial skin infection.  · Before your blisters change into scabs, your shingles infection can cause chickenpox in people who have never had it or have never been vaccinated against it. To prevent this from happening, avoid contact with other people, especially:  ? Babies.  ? Pregnant women.  ? Children who have eczema.  ? Elderly people who have transplants.  ? People who have chronic illnesses, such as cancer or AIDS.  Contact a health care provider if:  · Your pain is not relieved with prescribed medicines.  · Your pain does not get better after the rash heals.  · You have signs of infection in the rash area, such as:  ? More redness, swelling, or pain around the rash.  ? Fluid or blood coming from the rash.  ? The rash area feeling warm to the touch.  ? Pus or a bad smell coming from the rash.  Get help right away if:  · The rash is on your face or nose.  · You have facial pain, pain around your eye area, or loss of feeling on one side of your face.  · You have difficulty seeing.  · You have ear pain or have ringing in your ear.  · You have a loss of taste.  · Your condition gets worse.  Summary  · Shingles, which is also known as herpes zoster, is an infection that causes a painful skin rash and fluid-filled blisters.  · This condition is diagnosed with a skin exam. Skin or fluid samples may be taken from the blisters and examined before the diagnosis is made.  · Keep your rash covered with a loose bandage (dressing). Wear loose-fitting clothing to help ease the pain of material rubbing against the rash.  · Before your blisters change into scabs, your shingles infection can cause chickenpox in people who have never had it or have never been vaccinated against it.  This information is not  intended to replace advice given to you by your health care provider. Make sure you discuss any questions you have with your health care provider.  Document Released: 12/18/2006 Document Revised: 04/10/2020 Document Reviewed: 08/22/2018  [employer] Patient Education © 2020 [employer]

## 2020-07-09 NOTE — PROGRESS NOTES
Subjective   Jacqueline Mcdowell is a 49 y.o. female. She was previously seen by VIET Richter, but is new to me.     History of Present Illness   Pt presents today for follow-up for ED visit for shingles. She was prescribed Valtrex TID for 1 week and reports that she took all of the medication. Pt reports that she is feeling better since finishing the medication.    Pt is also being seen for follow-up for vaginal herpes. She states that she is out of her valtrex and is requesting a refill. Pt reports that she has not had an outbreak recently.  She states that medication does help her from having multiple outbreaks.     The following portions of the patient's history were reviewed and updated as appropriate: allergies, current medications, past family history, past medical history, past social history, past surgical history and problem list.    Review of Systems   Constitutional: Negative for chills, fatigue and fever.   Respiratory: Negative for cough and shortness of breath.    Cardiovascular: Negative for chest pain, palpitations and leg swelling.   Neurological: Negative for dizziness and headache.   Hematological: Negative.    Psychiatric/Behavioral: Negative.  Negative for sleep disturbance.       Objective   Physical Exam   Constitutional: She is oriented to person, place, and time. She appears well-developed and well-nourished.   HENT:   Head: Normocephalic and atraumatic.   Eyes: Pupils are equal, round, and reactive to light. Conjunctivae and EOM are normal.   Cardiovascular: Normal rate, regular rhythm, normal heart sounds and intact distal pulses.   No murmur heard.  Pulmonary/Chest: Effort normal and breath sounds normal.   Neurological: She is alert and oriented to person, place, and time.   Skin: Skin is warm and dry. No rash noted. No erythema. No pallor.   Three small scabs to left lower back. No infection noted.    Psychiatric: She has a normal mood and affect. Her behavior is normal. Judgment and  thought content normal.   Nursing note and vitals reviewed.      Vitals:    07/09/20 1446   BP: 140/80   Pulse: 100   Resp: 15   Temp: 97.1 °F (36.2 °C)   SpO2: 98%     Body mass index is 31.75 kg/m².    Procedures    Assessment/Plan   Problems Addressed this Visit     None      Visit Diagnoses     Herpes zoster without complication    -  Primary    Relevant Medications    valACYclovir (Valtrex) 500 MG tablet    Herpes genitalis in women        Relevant Medications    valACYclovir (Valtrex) 500 MG tablet        Return in about 1 month (around 8/9/2020) for Annual, Labs.         Answers for HPI/ROS submitted by the patient on 7/7/2020   What is the primary reason for your visit?: Other  Please describe your symptoms.: Was diagnosed with shingles from Er visit and they stated I needed to follow up with my primary. So that is what the appt is for.  Have you had these symptoms before?: No  How long have you been having these symptoms?: 1-4 days  Please list any medications you are currently taking for this condition.: I have completed the medication  Please describe any probable cause for these symptoms. : N/A

## 2020-07-15 DIAGNOSIS — N64.4 BREAST PAIN, LEFT: Primary | ICD-10-CM

## 2020-07-20 ENCOUNTER — OFFICE VISIT (OUTPATIENT)
Dept: FAMILY MEDICINE CLINIC | Facility: CLINIC | Age: 50
End: 2020-07-20

## 2020-07-20 VITALS
WEIGHT: 217 LBS | RESPIRATION RATE: 16 BRPM | SYSTOLIC BLOOD PRESSURE: 160 MMHG | BODY MASS INDEX: 32.14 KG/M2 | DIASTOLIC BLOOD PRESSURE: 100 MMHG | TEMPERATURE: 98.7 F | HEART RATE: 78 BPM | HEIGHT: 69 IN | OXYGEN SATURATION: 98 %

## 2020-07-20 DIAGNOSIS — N64.4 BREAST PAIN, LEFT: Primary | ICD-10-CM

## 2020-07-20 DIAGNOSIS — I10 ESSENTIAL HYPERTENSION: ICD-10-CM

## 2020-07-20 DIAGNOSIS — R07.89 OTHER CHEST PAIN: ICD-10-CM

## 2020-07-20 PROCEDURE — 99213 OFFICE O/P EST LOW 20 MIN: CPT | Performed by: NURSE PRACTITIONER

## 2020-07-20 PROCEDURE — 93000 ELECTROCARDIOGRAM COMPLETE: CPT | Performed by: NURSE PRACTITIONER

## 2020-07-20 RX ORDER — OLMESARTAN MEDOXOMIL 40 MG/1
40 TABLET ORAL DAILY
Qty: 30 TABLET | Refills: 3 | Status: SHIPPED | OUTPATIENT
Start: 2020-07-20 | End: 2020-09-10 | Stop reason: DRUGHIGH

## 2020-07-20 NOTE — PATIENT INSTRUCTIONS
"Return if symptoms worsen or fail to improve.   If you develop worsening chest pain, shortness of breath, increased dizziness, palpitations or a headache that will not resolve, go to ER.     Hypertension, Adult  High blood pressure (hypertension) is when the force of blood pumping through the arteries is too strong. The arteries are the blood vessels that carry blood from the heart throughout the body. Hypertension forces the heart to work harder to pump blood and may cause arteries to become narrow or stiff. Untreated or uncontrolled hypertension can cause a heart attack, heart failure, a stroke, kidney disease, and other problems.  A blood pressure reading consists of a higher number over a lower number. Ideally, your blood pressure should be below 120/80. The first (\"top\") number is called the systolic pressure. It is a measure of the pressure in your arteries as your heart beats. The second (\"bottom\") number is called the diastolic pressure. It is a measure of the pressure in your arteries as the heart relaxes.  What are the causes?  The exact cause of this condition is not known. There are some conditions that result in or are related to high blood pressure.  What increases the risk?  Some risk factors for high blood pressure are under your control. The following factors may make you more likely to develop this condition:  · Smoking.  · Having type 2 diabetes mellitus, high cholesterol, or both.  · Not getting enough exercise or physical activity.  · Being overweight.  · Having too much fat, sugar, calories, or salt (sodium) in your diet.  · Drinking too much alcohol.  Some risk factors for high blood pressure may be difficult or impossible to change. Some of these factors include:  · Having chronic kidney disease.  · Having a family history of high blood pressure.  · Age. Risk increases with age.  · Race. You may be at higher risk if you are .  · Gender. Men are at higher risk than women before " age 45. After age 65, women are at higher risk than men.  · Having obstructive sleep apnea.  · Stress.  What are the signs or symptoms?  High blood pressure may not cause symptoms. Very high blood pressure (hypertensive crisis) may cause:  · Headache.  · Anxiety.  · Shortness of breath.  · Nosebleed.  · Nausea and vomiting.  · Vision changes.  · Severe chest pain.  · Seizures.  How is this diagnosed?  This condition is diagnosed by measuring your blood pressure while you are seated, with your arm resting on a flat surface, your legs uncrossed, and your feet flat on the floor. The cuff of the blood pressure monitor will be placed directly against the skin of your upper arm at the level of your heart. It should be measured at least twice using the same arm. Certain conditions can cause a difference in blood pressure between your right and left arms.  Certain factors can cause blood pressure readings to be lower or higher than normal for a short period of time:  · When your blood pressure is higher when you are in a health care provider's office than when you are at home, this is called white coat hypertension. Most people with this condition do not need medicines.  · When your blood pressure is higher at home than when you are in a health care provider's office, this is called masked hypertension. Most people with this condition may need medicines to control blood pressure.  If you have a high blood pressure reading during one visit or you have normal blood pressure with other risk factors, you may be asked to:  · Return on a different day to have your blood pressure checked again.  · Monitor your blood pressure at home for 1 week or longer.  If you are diagnosed with hypertension, you may have other blood or imaging tests to help your health care provider understand your overall risk for other conditions.  How is this treated?  This condition is treated by making healthy lifestyle changes, such as eating healthy  foods, exercising more, and reducing your alcohol intake. Your health care provider may prescribe medicine if lifestyle changes are not enough to get your blood pressure under control, and if:  · Your systolic blood pressure is above 130.  · Your diastolic blood pressure is above 80.  Your personal target blood pressure may vary depending on your medical conditions, your age, and other factors.  Follow these instructions at home:  Eating and drinking    · Eat a diet that is high in fiber and potassium, and low in sodium, added sugar, and fat. An example eating plan is called the DASH (Dietary Approaches to Stop Hypertension) diet. To eat this way:  ? Eat plenty of fresh fruits and vegetables. Try to fill one half of your plate at each meal with fruits and vegetables.  ? Eat whole grains, such as whole-wheat pasta, brown rice, or whole-grain bread. Fill about one fourth of your plate with whole grains.  ? Eat or drink low-fat dairy products, such as skim milk or low-fat yogurt.  ? Avoid fatty cuts of meat, processed or cured meats, and poultry with skin. Fill about one fourth of your plate with lean proteins, such as fish, chicken without skin, beans, eggs, or tofu.  ? Avoid pre-made and processed foods. These tend to be higher in sodium, added sugar, and fat.  · Reduce your daily sodium intake. Most people with hypertension should eat less than 1,500 mg of sodium a day.  · Do not drink alcohol if:  ? Your health care provider tells you not to drink.  ? You are pregnant, may be pregnant, or are planning to become pregnant.  · If you drink alcohol:  ? Limit how much you use to:  § 0-1 drink a day for women.  § 0-2 drinks a day for men.  ? Be aware of how much alcohol is in your drink. In the [place] one drink equals one 12 oz bottle of beer (355 mL), one 5 oz glass of wine (148 mL), or one 1½ oz glass of hard liquor (44 mL).  Lifestyle    · Work with your health care provider to maintain a healthy body weight or to lose  weight. Ask what an ideal weight is for you.  · Get at least 30 minutes of exercise most days of the week. Activities may include walking, swimming, or biking.  · Include exercise to strengthen your muscles (resistance exercise), such as Pilates or lifting weights, as part of your weekly exercise routine. Try to do these types of exercises for 30 minutes at least 3 days a week.  · Do not use any products that contain nicotine or tobacco, such as cigarettes, e-cigarettes, and chewing tobacco. If you need help quitting, ask your health care provider.  · Monitor your blood pressure at home as told by your health care provider.  · Keep all follow-up visits as told by your health care provider. This is important.  Medicines  · Take over-the-counter and prescription medicines only as told by your health care provider. Follow directions carefully. Blood pressure medicines must be taken as prescribed.  · Do not skip doses of blood pressure medicine. Doing this puts you at risk for problems and can make the medicine less effective.  · Ask your health care provider about side effects or reactions to medicines that you should watch for.  Contact a health care provider if you:  · Think you are having a reaction to a medicine you are taking.  · Have headaches that keep coming back (recurring).  · Feel dizzy.  · Have swelling in your ankles.  · Have trouble with your vision.  Get help right away if you:  · Develop a severe headache or confusion.  · Have unusual weakness or numbness.  · Feel faint.  · Have severe pain in your chest or abdomen.  · Vomit repeatedly.  · Have trouble breathing.  Summary  · Hypertension is when the force of blood pumping through your arteries is too strong. If this condition is not controlled, it may put you at risk for serious complications.  · Your personal target blood pressure may vary depending on your medical conditions, your age, and other factors. For most people, a normal blood pressure is  less than 120/80.  · Hypertension is treated with lifestyle changes, medicines, or a combination of both. Lifestyle changes include losing weight, eating a healthy, low-sodium diet, exercising more, and limiting alcohol.  This information is not intended to replace advice given to you by your health care provider. Make sure you discuss any questions you have with your health care provider.  Document Released: 12/18/2006 Document Revised: 08/28/2019 Document Reviewed: 08/28/2019  Elsevier Patient Education © 2020 [employer]

## 2020-07-20 NOTE — PROGRESS NOTES
Subjective   Jacqueline Mcdowell is a 49 y.o. female.     History of Present Illness   Pt presents for left breast pain, acute. Pt reports that pain started 1 week ago. She states that she is unable to tell if it is in her breast or chest.  Pt report pain radiates to left scapula and left axilla area. She reports family history of CAD, with her father having complications of this prior to passing. Pt reports pain is like a pressure and is constant.     Pt has elevated bp at visit. She is not currently taking any medications. She denies any headaches, dizziness, palpitations, shortness of breath. Pt is reports atypical chest pain.     The following portions of the patient's history were reviewed and updated as appropriate: allergies, current medications, past family history, past medical history, past social history, past surgical history and problem list.    Review of Systems   Constitutional: Negative for chills, fatigue and fever.   Eyes: Negative for blurred vision and double vision.   Respiratory: Positive for chest tightness. Negative for cough and shortness of breath.    Cardiovascular: Positive for chest pain. Negative for palpitations and leg swelling.   Genitourinary: Positive for breast pain.   Neurological: Negative for dizziness, weakness and headache.       Objective   Physical Exam   Constitutional: She is oriented to person, place, and time. She appears well-developed and well-nourished.   HENT:   Head: Normocephalic and atraumatic.   Eyes: Pupils are equal, round, and reactive to light. Conjunctivae and EOM are normal.   Neck: Normal range of motion. Neck supple. No thyromegaly present.   Cardiovascular: Normal rate, regular rhythm, normal heart sounds, intact distal pulses and normal pulses. Exam reveals no gallop and no friction rub.   No murmur heard.  Pulmonary/Chest: Effort normal and breath sounds normal. Right breast exhibits no inverted nipple, no mass, no nipple discharge, no skin change and no  tenderness. Left breast exhibits tenderness. Left breast exhibits no inverted nipple, no mass, no nipple discharge and no skin change.       Lymphadenopathy:     She has no cervical adenopathy.   Neurological: She is alert and oriented to person, place, and time. No cranial nerve deficit.   Skin: Skin is warm and dry. Capillary refill takes 2 to 3 seconds.   Psychiatric: She has a normal mood and affect. Her behavior is normal. Judgment and thought content normal.   Nursing note and vitals reviewed.      Vitals:    07/20/20 1452   BP: 160/100   Pulse: 78   Resp: 16   Temp: 98.7 °F (37.1 °C)   SpO2: 98%     Body mass index is 32.05 kg/m².      ECG 12 Lead  Date/Time: 7/20/2020 4:04 PM  Performed by: Cristal Real APRN  Authorized by: Cristal Real APRN   Comparison: not compared with previous ECG   Rhythm: sinus rhythm  Rate: normal  BPM: 73  Other findings: T wave abnormality    Clinical impression: normal ECG  Comments: Sinus rhythm. Non-specific t-wave abnormality. Borderline ECG            Assessment/Plan   Problems Addressed this Visit     None      Visit Diagnoses     Breast pain, left    -  Primary  Mammogram   U/S of left breast    Other chest pain        Relevant Orders    ECG 12 Lead    Comprehensive Metabolic Panel    CBC & Differential   HYPERTENSION  Olmesartan 40mg 1 p.o. QD     Return in about 1 week (around 7/27/2020) for Recheck BP.            Patient Instructions   Return if symptoms worsen or fail to improve.   If you develop worsening chest pain, shortness of breath, increased dizziness, palpitations or a headache that will not resolve, go to ER.     Hypertension, Adult  High blood pressure (hypertension) is when the force of blood pumping through the arteries is too strong. The arteries are the blood vessels that carry blood from the heart throughout the body. Hypertension forces the heart to work harder to pump blood and may cause arteries to become narrow or stiff. Untreated or  "uncontrolled hypertension can cause a heart attack, heart failure, a stroke, kidney disease, and other problems.  A blood pressure reading consists of a higher number over a lower number. Ideally, your blood pressure should be below 120/80. The first (\"top\") number is called the systolic pressure. It is a measure of the pressure in your arteries as your heart beats. The second (\"bottom\") number is called the diastolic pressure. It is a measure of the pressure in your arteries as the heart relaxes.  What are the causes?  The exact cause of this condition is not known. There are some conditions that result in or are related to high blood pressure.  What increases the risk?  Some risk factors for high blood pressure are under your control. The following factors may make you more likely to develop this condition:  · Smoking.  · Having type 2 diabetes mellitus, high cholesterol, or both.  · Not getting enough exercise or physical activity.  · Being overweight.  · Having too much fat, sugar, calories, or salt (sodium) in your diet.  · Drinking too much alcohol.  Some risk factors for high blood pressure may be difficult or impossible to change. Some of these factors include:  · Having chronic kidney disease.  · Having a family history of high blood pressure.  · Age. Risk increases with age.  · Race. You may be at higher risk if you are .  · Gender. Men are at higher risk than women before age 45. After age 65, women are at higher risk than men.  · Having obstructive sleep apnea.  · Stress.  What are the signs or symptoms?  High blood pressure may not cause symptoms. Very high blood pressure (hypertensive crisis) may cause:  · Headache.  · Anxiety.  · Shortness of breath.  · Nosebleed.  · Nausea and vomiting.  · Vision changes.  · Severe chest pain.  · Seizures.  How is this diagnosed?  This condition is diagnosed by measuring your blood pressure while you are seated, with your arm resting on a flat " surface, your legs uncrossed, and your feet flat on the floor. The cuff of the blood pressure monitor will be placed directly against the skin of your upper arm at the level of your heart. It should be measured at least twice using the same arm. Certain conditions can cause a difference in blood pressure between your right and left arms.  Certain factors can cause blood pressure readings to be lower or higher than normal for a short period of time:  · When your blood pressure is higher when you are in a health care provider's office than when you are at home, this is called white coat hypertension. Most people with this condition do not need medicines.  · When your blood pressure is higher at home than when you are in a health care provider's office, this is called masked hypertension. Most people with this condition may need medicines to control blood pressure.  If you have a high blood pressure reading during one visit or you have normal blood pressure with other risk factors, you may be asked to:  · Return on a different day to have your blood pressure checked again.  · Monitor your blood pressure at home for 1 week or longer.  If you are diagnosed with hypertension, you may have other blood or imaging tests to help your health care provider understand your overall risk for other conditions.  How is this treated?  This condition is treated by making healthy lifestyle changes, such as eating healthy foods, exercising more, and reducing your alcohol intake. Your health care provider may prescribe medicine if lifestyle changes are not enough to get your blood pressure under control, and if:  · Your systolic blood pressure is above 130.  · Your diastolic blood pressure is above 80.  Your personal target blood pressure may vary depending on your medical conditions, your age, and other factors.  Follow these instructions at home:  Eating and drinking    · Eat a diet that is high in fiber and potassium, and low in  sodium, added sugar, and fat. An example eating plan is called the DASH (Dietary Approaches to Stop Hypertension) diet. To eat this way:  ? Eat plenty of fresh fruits and vegetables. Try to fill one half of your plate at each meal with fruits and vegetables.  ? Eat whole grains, such as whole-wheat pasta, brown rice, or whole-grain bread. Fill about one fourth of your plate with whole grains.  ? Eat or drink low-fat dairy products, such as skim milk or low-fat yogurt.  ? Avoid fatty cuts of meat, processed or cured meats, and poultry with skin. Fill about one fourth of your plate with lean proteins, such as fish, chicken without skin, beans, eggs, or tofu.  ? Avoid pre-made and processed foods. These tend to be higher in sodium, added sugar, and fat.  · Reduce your daily sodium intake. Most people with hypertension should eat less than 1,500 mg of sodium a day.  · Do not drink alcohol if:  ? Your health care provider tells you not to drink.  ? You are pregnant, may be pregnant, or are planning to become pregnant.  · If you drink alcohol:  ? Limit how much you use to:  § 0-1 drink a day for women.  § 0-2 drinks a day for men.  ? Be aware of how much alcohol is in your drink. In the [place] one drink equals one 12 oz bottle of beer (355 mL), one 5 oz glass of wine (148 mL), or one 1½ oz glass of hard liquor (44 mL).  Lifestyle    · Work with your health care provider to maintain a healthy body weight or to lose weight. Ask what an ideal weight is for you.  · Get at least 30 minutes of exercise most days of the week. Activities may include walking, swimming, or biking.  · Include exercise to strengthen your muscles (resistance exercise), such as Pilates or lifting weights, as part of your weekly exercise routine. Try to do these types of exercises for 30 minutes at least 3 days a week.  · Do not use any products that contain nicotine or tobacco, such as cigarettes, e-cigarettes, and chewing tobacco. If you need help  quitting, ask your health care provider.  · Monitor your blood pressure at home as told by your health care provider.  · Keep all follow-up visits as told by your health care provider. This is important.  Medicines  · Take over-the-counter and prescription medicines only as told by your health care provider. Follow directions carefully. Blood pressure medicines must be taken as prescribed.  · Do not skip doses of blood pressure medicine. Doing this puts you at risk for problems and can make the medicine less effective.  · Ask your health care provider about side effects or reactions to medicines that you should watch for.  Contact a health care provider if you:  · Think you are having a reaction to a medicine you are taking.  · Have headaches that keep coming back (recurring).  · Feel dizzy.  · Have swelling in your ankles.  · Have trouble with your vision.  Get help right away if you:  · Develop a severe headache or confusion.  · Have unusual weakness or numbness.  · Feel faint.  · Have severe pain in your chest or abdomen.  · Vomit repeatedly.  · Have trouble breathing.  Summary  · Hypertension is when the force of blood pumping through your arteries is too strong. If this condition is not controlled, it may put you at risk for serious complications.  · Your personal target blood pressure may vary depending on your medical conditions, your age, and other factors. For most people, a normal blood pressure is less than 120/80.  · Hypertension is treated with lifestyle changes, medicines, or a combination of both. Lifestyle changes include losing weight, eating a healthy, low-sodium diet, exercising more, and limiting alcohol.  This information is not intended to replace advice given to you by your health care provider. Make sure you discuss any questions you have with your health care provider.  Document Released: 12/18/2006 Document Revised: 08/28/2019 Document Reviewed: 08/28/2019  Elsevier Patient Education © 2020  [employer]

## 2020-07-21 LAB
ALBUMIN SERPL-MCNC: 4.2 G/DL (ref 3.8–4.8)
ALBUMIN/GLOB SERPL: 1.1 {RATIO} (ref 1.2–2.2)
ALP SERPL-CCNC: 88 IU/L (ref 39–117)
ALT SERPL-CCNC: 16 IU/L (ref 0–32)
AST SERPL-CCNC: 17 IU/L (ref 0–40)
BASOPHILS # BLD AUTO: 0 X10E3/UL (ref 0–0.2)
BASOPHILS NFR BLD AUTO: 0 %
BILIRUB SERPL-MCNC: <0.2 MG/DL (ref 0–1.2)
BUN SERPL-MCNC: 7 MG/DL (ref 6–24)
BUN/CREAT SERPL: 13 (ref 9–23)
CALCIUM SERPL-MCNC: 9.3 MG/DL (ref 8.7–10.2)
CHLORIDE SERPL-SCNC: 99 MMOL/L (ref 96–106)
CO2 SERPL-SCNC: 26 MMOL/L (ref 20–29)
CREAT SERPL-MCNC: 0.56 MG/DL (ref 0.57–1)
EOSINOPHIL # BLD AUTO: 0.2 X10E3/UL (ref 0–0.4)
EOSINOPHIL NFR BLD AUTO: 3 %
ERYTHROCYTE [DISTWIDTH] IN BLOOD BY AUTOMATED COUNT: 16.2 % (ref 11.7–15.4)
GLOBULIN SER CALC-MCNC: 3.7 G/DL (ref 1.5–4.5)
GLUCOSE SERPL-MCNC: 99 MG/DL (ref 65–99)
HCT VFR BLD AUTO: 33.4 % (ref 34–46.6)
HGB BLD-MCNC: 10.2 G/DL (ref 11.1–15.9)
IMM GRANULOCYTES # BLD AUTO: 0 X10E3/UL (ref 0–0.1)
IMM GRANULOCYTES NFR BLD AUTO: 0 %
LYMPHOCYTES # BLD AUTO: 2.5 X10E3/UL (ref 0.7–3.1)
LYMPHOCYTES NFR BLD AUTO: 31 %
MCH RBC QN AUTO: 24.2 PG (ref 26.6–33)
MCHC RBC AUTO-ENTMCNC: 30.5 G/DL (ref 31.5–35.7)
MCV RBC AUTO: 79 FL (ref 79–97)
MONOCYTES # BLD AUTO: 1 X10E3/UL (ref 0.1–0.9)
MONOCYTES NFR BLD AUTO: 12 %
NEUTROPHILS # BLD AUTO: 4.2 X10E3/UL (ref 1.4–7)
NEUTROPHILS NFR BLD AUTO: 54 %
PLATELET # BLD AUTO: 514 X10E3/UL (ref 150–450)
POTASSIUM SERPL-SCNC: 3.6 MMOL/L (ref 3.5–5.2)
PROT SERPL-MCNC: 7.9 G/DL (ref 6–8.5)
RBC # BLD AUTO: 4.22 X10E6/UL (ref 3.77–5.28)
SODIUM SERPL-SCNC: 137 MMOL/L (ref 134–144)
WBC # BLD AUTO: 8 X10E3/UL (ref 3.4–10.8)

## 2020-07-31 ENCOUNTER — HOSPITAL ENCOUNTER (OUTPATIENT)
Dept: ULTRASOUND IMAGING | Facility: HOSPITAL | Age: 50
Discharge: HOME | End: 2020-07-31

## 2020-07-31 ENCOUNTER — HOSPITAL ENCOUNTER (OUTPATIENT)
Dept: MAMMOGRAPHY | Facility: HOSPITAL | Age: 50
Discharge: HOME | End: 2020-07-31
Admitting: NURSE PRACTITIONER

## 2020-07-31 DIAGNOSIS — N64.4 BREAST PAIN, LEFT: ICD-10-CM

## 2020-07-31 PROCEDURE — 76642 ULTRASOUND BREAST LIMITED: CPT

## 2020-07-31 PROCEDURE — G0279 TOMOSYNTHESIS, MAMMO: HCPCS

## 2020-07-31 PROCEDURE — 77066 DX MAMMO INCL CAD BI: CPT

## 2020-08-03 DIAGNOSIS — R92.8 ABNORMAL MAMMOGRAM: Primary | ICD-10-CM

## 2020-08-06 DIAGNOSIS — R92.8 ABNORMAL MAMMOGRAM: Primary | ICD-10-CM

## 2020-08-13 ENCOUNTER — HOSPITAL ENCOUNTER (OUTPATIENT)
Dept: MAMMOGRAPHY | Facility: HOSPITAL | Age: 50
Discharge: HOME | End: 2020-08-13

## 2020-08-13 ENCOUNTER — HOSPITAL ENCOUNTER (OUTPATIENT)
Dept: ULTRASOUND IMAGING | Facility: HOSPITAL | Age: 50
Discharge: HOME | End: 2020-08-13
Admitting: NURSE PRACTITIONER

## 2020-08-13 VITALS
WEIGHT: 216.93 LBS | SYSTOLIC BLOOD PRESSURE: 147 MMHG | DIASTOLIC BLOOD PRESSURE: 97 MMHG | BODY MASS INDEX: 32.13 KG/M2 | HEART RATE: 79 BPM | HEIGHT: 69 IN | OXYGEN SATURATION: 100 % | RESPIRATION RATE: 18 BRPM | TEMPERATURE: 97.9 F

## 2020-08-13 DIAGNOSIS — R92.8 ABNORMAL MAMMOGRAM: ICD-10-CM

## 2020-08-13 DIAGNOSIS — N64.4 PAIN OF RIGHT BREAST: ICD-10-CM

## 2020-08-13 PROCEDURE — 77065 DX MAMMO INCL CAD UNI: CPT

## 2020-08-13 PROCEDURE — 25010000003 LIDOCAINE 1 % SOLUTION: Performed by: RADIOLOGY

## 2020-08-13 PROCEDURE — A4648 IMPLANTABLE TISSUE MARKER: HCPCS

## 2020-08-13 PROCEDURE — 88305 TISSUE EXAM BY PATHOLOGIST: CPT | Performed by: NURSE PRACTITIONER

## 2020-08-13 RX ORDER — LIDOCAINE HYDROCHLORIDE AND EPINEPHRINE 10; 10 MG/ML; UG/ML
10 INJECTION, SOLUTION INFILTRATION; PERINEURAL ONCE
Status: DISCONTINUED | OUTPATIENT
Start: 2020-08-13 | End: 2020-08-14 | Stop reason: HOSPADM

## 2020-08-13 RX ORDER — LIDOCAINE HYDROCHLORIDE 10 MG/ML
10 INJECTION, SOLUTION INFILTRATION; PERINEURAL ONCE
Status: COMPLETED | OUTPATIENT
Start: 2020-08-13 | End: 2020-08-13

## 2020-08-13 RX ADMIN — LIDOCAINE HYDROCHLORIDE 10 ML: 10 INJECTION, SOLUTION INFILTRATION; PERINEURAL at 11:23

## 2020-08-13 NOTE — NURSING NOTE
Biopsy site to right outer, lower breast clear with Skin Affix dry and intact. No firmness or swelling noted at or around biopsy site. Denies pain. Ice pack with protective covering applied to biopsy site. Discharge instructions discussed with understanding voiced by patient. Copies provided to patient. No distress noted. To home via private vehicle.

## 2020-08-13 NOTE — H&P
Name: Jacqueline Mcdowell ADMIT: 2020   : 1970  PCP: Cristal Real APRN    MRN: [id number] LOS: 0 days   AGE/SEX: 49 y.o. female  ROOM: Room/bed info not found       Chief complaint right breast mass    Present Illness or Internal History:  Patient is a 49 y.o. female presents with right breast mass for ultrasound guided biopsy.     Past Surgical History:  Past Surgical History:   Procedure Laterality Date   • BREAST BIOPSY     • ESSURE TUBAL LIGATION     • FOOT SURGERY Bilateral    • KNEE ARTHROSCOPY Left        Past Medical History:  Past Medical History:   Diagnosis Date   • Hypertension        Home Medications:    (Not in a hospital admission)    Allergies:  Patient has no known allergies.    Family History:  Family History   Problem Relation Age of Onset   • Diabetes Mother    • Hypertension Mother    • Hyperlipidemia Mother    • Cancer Father    • Heart disease Father    • Breast cancer Maternal Grandmother        Social History:  Social History     Tobacco Use   • Smoking status: Former Smoker     Packs/day: 0.25     Start date: 4/15/2017     Last attempt to quit: 3/28/2018     Years since quittin.3   • Smokeless tobacco: Former User   Substance Use Topics   • Alcohol use: No   • Drug use: No        Objective     Physical Exam:    No exam performed today,    Vital Signs  Temp:  [97.9 °F (36.6 °C)] 97.9 °F (36.6 °C)  Heart Rate:  [79-82] 79  Resp:  [16-18] 18  BP: (146-147)/(92-99) 147/99    Anticipated Surgical Procedure:  Ultrasound guided core needle biopsy of the right breast    The risks, benefits and alternatives of this procedure have been discussed with the patient or responsible party: Yes        Karissa Barrera MD  20  12:18

## 2020-08-14 LAB
CYTO UR: NORMAL
LAB AP CASE REPORT: NORMAL
LAB AP CLINICAL INFORMATION: NORMAL
LAB AP DIAGNOSIS COMMENT: NORMAL
PATH REPORT.FINAL DX SPEC: NORMAL
PATH REPORT.GROSS SPEC: NORMAL

## 2020-08-17 DIAGNOSIS — R92.8 ABNORMAL MAMMOGRAM: Primary | ICD-10-CM

## 2020-08-18 DIAGNOSIS — R92.8 ABNORMAL MAMMOGRAM: Primary | ICD-10-CM

## 2020-09-10 ENCOUNTER — HOSPITAL ENCOUNTER (OUTPATIENT)
Dept: CT IMAGING | Facility: HOSPITAL | Age: 50
Discharge: HOME | End: 2020-09-10

## 2020-09-10 ENCOUNTER — TELEPHONE (OUTPATIENT)
Dept: CARDIOLOGY | Facility: CLINIC | Age: 50
End: 2020-09-10

## 2020-09-10 ENCOUNTER — OFFICE VISIT (OUTPATIENT)
Dept: FAMILY MEDICINE CLINIC | Facility: CLINIC | Age: 50
End: 2020-09-10

## 2020-09-10 ENCOUNTER — HOSPITAL ENCOUNTER (OUTPATIENT)
Dept: CARDIOLOGY | Facility: HOSPITAL | Age: 50
Discharge: HOME | End: 2020-09-10

## 2020-09-10 VITALS
DIASTOLIC BLOOD PRESSURE: 100 MMHG | BODY MASS INDEX: 33.03 KG/M2 | HEIGHT: 69 IN | TEMPERATURE: 98.9 F | WEIGHT: 223 LBS | OXYGEN SATURATION: 98 % | RESPIRATION RATE: 14 BRPM | SYSTOLIC BLOOD PRESSURE: 182 MMHG | HEART RATE: 91 BPM

## 2020-09-10 VITALS
BODY MASS INDEX: 32.58 KG/M2 | DIASTOLIC BLOOD PRESSURE: 82 MMHG | HEIGHT: 69 IN | HEART RATE: 70 BPM | OXYGEN SATURATION: 97 % | SYSTOLIC BLOOD PRESSURE: 130 MMHG | WEIGHT: 220 LBS | TEMPERATURE: 98.2 F

## 2020-09-10 DIAGNOSIS — R06.02 SHORTNESS OF BREATH: ICD-10-CM

## 2020-09-10 DIAGNOSIS — I10 ESSENTIAL HYPERTENSION: ICD-10-CM

## 2020-09-10 DIAGNOSIS — R07.9 CHEST PAIN, UNSPECIFIED TYPE: ICD-10-CM

## 2020-09-10 DIAGNOSIS — I10 ESSENTIAL HYPERTENSION: Primary | ICD-10-CM

## 2020-09-10 DIAGNOSIS — R07.89 ATYPICAL CHEST PAIN: ICD-10-CM

## 2020-09-10 LAB
ALBUMIN SERPL-MCNC: 4.1 G/DL (ref 3.5–5.2)
ALBUMIN/GLOB SERPL: 1.1 G/DL
ALP SERPL-CCNC: 70 U/L (ref 39–117)
ALT SERPL W P-5'-P-CCNC: 24 U/L (ref 1–33)
ANION GAP SERPL CALCULATED.3IONS-SCNC: 8.9 MMOL/L (ref 5–15)
AST SERPL-CCNC: 16 U/L (ref 1–32)
BASOPHILS # BLD AUTO: 0.04 10*3/MM3 (ref 0–0.2)
BASOPHILS NFR BLD AUTO: 0.7 % (ref 0–1.5)
BILIRUB SERPL-MCNC: 0.2 MG/DL (ref 0–1.2)
BUN SERPL-MCNC: 8 MG/DL (ref 6–20)
BUN/CREAT SERPL: 12.1 (ref 7–25)
CALCIUM SPEC-SCNC: 9.8 MG/DL (ref 8.6–10.5)
CHLORIDE SERPL-SCNC: 102 MMOL/L (ref 98–107)
CO2 SERPL-SCNC: 23.1 MMOL/L (ref 22–29)
CREAT SERPL-MCNC: 0.66 MG/DL (ref 0.57–1)
D DIMER PPP FEU-MCNC: 0.48 MCGFEU/ML (ref 0–0.49)
DEPRECATED RDW RBC AUTO: 58.8 FL (ref 37–54)
EOSINOPHIL # BLD AUTO: 0.12 10*3/MM3 (ref 0–0.4)
EOSINOPHIL NFR BLD AUTO: 2 % (ref 0.3–6.2)
ERYTHROCYTE [DISTWIDTH] IN BLOOD BY AUTOMATED COUNT: 19.1 % (ref 12.3–15.4)
GFR SERPL CREATININE-BSD FRML MDRD: 116 ML/MIN/1.73
GLOBULIN UR ELPH-MCNC: 3.8 GM/DL
GLUCOSE SERPL-MCNC: 101 MG/DL (ref 65–99)
HCT VFR BLD AUTO: 37.7 % (ref 34–46.6)
HGB BLD-MCNC: 11.6 G/DL (ref 12–15.9)
IMM GRANULOCYTES # BLD AUTO: 0.01 10*3/MM3 (ref 0–0.05)
IMM GRANULOCYTES NFR BLD AUTO: 0.2 % (ref 0–0.5)
LYMPHOCYTES # BLD AUTO: 1.83 10*3/MM3 (ref 0.7–3.1)
LYMPHOCYTES NFR BLD AUTO: 30.3 % (ref 19.6–45.3)
MCH RBC QN AUTO: 25.6 PG (ref 26.6–33)
MCHC RBC AUTO-ENTMCNC: 30.8 G/DL (ref 31.5–35.7)
MCV RBC AUTO: 83.2 FL (ref 79–97)
MONOCYTES # BLD AUTO: 0.63 10*3/MM3 (ref 0.1–0.9)
MONOCYTES NFR BLD AUTO: 10.4 % (ref 5–12)
NEUTROPHILS NFR BLD AUTO: 3.41 10*3/MM3 (ref 1.7–7)
NEUTROPHILS NFR BLD AUTO: 56.4 % (ref 42.7–76)
NRBC BLD AUTO-RTO: 0 /100 WBC (ref 0–0.2)
NT-PROBNP SERPL-MCNC: 29.3 PG/ML (ref 0–450)
PLATELET # BLD AUTO: 436 10*3/MM3 (ref 140–450)
PMV BLD AUTO: 10.4 FL (ref 6–12)
POTASSIUM SERPL-SCNC: 3.8 MMOL/L (ref 3.5–5.2)
PROT SERPL-MCNC: 7.9 G/DL (ref 6–8.5)
RBC # BLD AUTO: 4.53 10*6/MM3 (ref 3.77–5.28)
SODIUM SERPL-SCNC: 134 MMOL/L (ref 136–145)
TROPONIN T SERPL-MCNC: <0.01 NG/ML (ref 0–0.03)
WBC # BLD AUTO: 6.04 10*3/MM3 (ref 3.4–10.8)

## 2020-09-10 PROCEDURE — 36415 COLL VENOUS BLD VENIPUNCTURE: CPT

## 2020-09-10 PROCEDURE — 0 IOPAMIDOL PER 1 ML: Performed by: INTERNAL MEDICINE

## 2020-09-10 PROCEDURE — 71275 CT ANGIOGRAPHY CHEST: CPT

## 2020-09-10 PROCEDURE — 99244 OFF/OP CNSLTJ NEW/EST MOD 40: CPT | Performed by: INTERNAL MEDICINE

## 2020-09-10 PROCEDURE — 80053 COMPREHEN METABOLIC PANEL: CPT | Performed by: INTERNAL MEDICINE

## 2020-09-10 PROCEDURE — 85379 FIBRIN DEGRADATION QUANT: CPT | Performed by: INTERNAL MEDICINE

## 2020-09-10 PROCEDURE — 93010 ELECTROCARDIOGRAM REPORT: CPT | Performed by: INTERNAL MEDICINE

## 2020-09-10 PROCEDURE — 93005 ELECTROCARDIOGRAM TRACING: CPT | Performed by: INTERNAL MEDICINE

## 2020-09-10 PROCEDURE — 83880 ASSAY OF NATRIURETIC PEPTIDE: CPT | Performed by: INTERNAL MEDICINE

## 2020-09-10 PROCEDURE — 84484 ASSAY OF TROPONIN QUANT: CPT | Performed by: INTERNAL MEDICINE

## 2020-09-10 PROCEDURE — 85025 COMPLETE CBC W/AUTO DIFF WBC: CPT | Performed by: INTERNAL MEDICINE

## 2020-09-10 PROCEDURE — 99214 OFFICE O/P EST MOD 30 MIN: CPT | Performed by: NURSE PRACTITIONER

## 2020-09-10 PROCEDURE — 94760 N-INVAS EAR/PLS OXIMETRY 1: CPT

## 2020-09-10 RX ORDER — AMLODIPINE BESYLATE 5 MG/1
5 TABLET ORAL DAILY
Qty: 30 TABLET | Refills: 3 | Status: SHIPPED | OUTPATIENT
Start: 2020-09-10 | End: 2020-09-24 | Stop reason: SDUPTHER

## 2020-09-10 RX ORDER — SODIUM CHLORIDE 0.9 % (FLUSH) 0.9 %
10 SYRINGE (ML) INJECTION AS NEEDED
Status: DISCONTINUED | OUTPATIENT
Start: 2020-09-10 | End: 2021-06-17

## 2020-09-10 RX ORDER — NITROGLYCERIN 0.4 MG/1
0.4 TABLET SUBLINGUAL
Status: DISCONTINUED | OUTPATIENT
Start: 2020-09-10 | End: 2021-08-27

## 2020-09-10 RX ORDER — OLMESARTAN MEDOXOMIL AND HYDROCHLOROTHIAZIDE 40/12.5 40; 12.5 MG/1; MG/1
1 TABLET ORAL DAILY
Qty: 30 TABLET | Refills: 3 | Status: SHIPPED | OUTPATIENT
Start: 2020-09-10 | End: 2021-02-08

## 2020-09-10 RX ADMIN — IOPAMIDOL 95 ML: 755 INJECTION, SOLUTION INTRAVENOUS at 15:25

## 2020-09-10 NOTE — PATIENT INSTRUCTIONS
"If you develop chest pain, shortness of breath, increased dizziness, palpitations or a headache that will not resolve, go to ER.   Return if symptoms worsen or fail to improve.    Hypertension, Adult  High blood pressure (hypertension) is when the force of blood pumping through the arteries is too strong. The arteries are the blood vessels that carry blood from the heart throughout the body. Hypertension forces the heart to work harder to pump blood and may cause arteries to become narrow or stiff. Untreated or uncontrolled hypertension can cause a heart attack, heart failure, a stroke, kidney disease, and other problems.  A blood pressure reading consists of a higher number over a lower number. Ideally, your blood pressure should be below 120/80. The first (\"top\") number is called the systolic pressure. It is a measure of the pressure in your arteries as your heart beats. The second (\"bottom\") number is called the diastolic pressure. It is a measure of the pressure in your arteries as the heart relaxes.  What are the causes?  The exact cause of this condition is not known. There are some conditions that result in or are related to high blood pressure.  What increases the risk?  Some risk factors for high blood pressure are under your control. The following factors may make you more likely to develop this condition:  · Smoking.  · Having type 2 diabetes mellitus, high cholesterol, or both.  · Not getting enough exercise or physical activity.  · Being overweight.  · Having too much fat, sugar, calories, or salt (sodium) in your diet.  · Drinking too much alcohol.  Some risk factors for high blood pressure may be difficult or impossible to change. Some of these factors include:  · Having chronic kidney disease.  · Having a family history of high blood pressure.  · Age. Risk increases with age.  · Race. You may be at higher risk if you are .  · Gender. Men are at higher risk than women before age 45. " After age 65, women are at higher risk than men.  · Having obstructive sleep apnea.  · Stress.  What are the signs or symptoms?  High blood pressure may not cause symptoms. Very high blood pressure (hypertensive crisis) may cause:  · Headache.  · Anxiety.  · Shortness of breath.  · Nosebleed.  · Nausea and vomiting.  · Vision changes.  · Severe chest pain.  · Seizures.  How is this diagnosed?  This condition is diagnosed by measuring your blood pressure while you are seated, with your arm resting on a flat surface, your legs uncrossed, and your feet flat on the floor. The cuff of the blood pressure monitor will be placed directly against the skin of your upper arm at the level of your heart. It should be measured at least twice using the same arm. Certain conditions can cause a difference in blood pressure between your right and left arms.  Certain factors can cause blood pressure readings to be lower or higher than normal for a short period of time:  · When your blood pressure is higher when you are in a health care provider's office than when you are at home, this is called white coat hypertension. Most people with this condition do not need medicines.  · When your blood pressure is higher at home than when you are in a health care provider's office, this is called masked hypertension. Most people with this condition may need medicines to control blood pressure.  If you have a high blood pressure reading during one visit or you have normal blood pressure with other risk factors, you may be asked to:  · Return on a different day to have your blood pressure checked again.  · Monitor your blood pressure at home for 1 week or longer.  If you are diagnosed with hypertension, you may have other blood or imaging tests to help your health care provider understand your overall risk for other conditions.  How is this treated?  This condition is treated by making healthy lifestyle changes, such as eating healthy foods,  exercising more, and reducing your alcohol intake. Your health care provider may prescribe medicine if lifestyle changes are not enough to get your blood pressure under control, and if:  · Your systolic blood pressure is above 130.  · Your diastolic blood pressure is above 80.  Your personal target blood pressure may vary depending on your medical conditions, your age, and other factors.  Follow these instructions at home:  Eating and drinking    · Eat a diet that is high in fiber and potassium, and low in sodium, added sugar, and fat. An example eating plan is called the DASH (Dietary Approaches to Stop Hypertension) diet. To eat this way:  ? Eat plenty of fresh fruits and vegetables. Try to fill one half of your plate at each meal with fruits and vegetables.  ? Eat whole grains, such as whole-wheat pasta, brown rice, or whole-grain bread. Fill about one fourth of your plate with whole grains.  ? Eat or drink low-fat dairy products, such as skim milk or low-fat yogurt.  ? Avoid fatty cuts of meat, processed or cured meats, and poultry with skin. Fill about one fourth of your plate with lean proteins, such as fish, chicken without skin, beans, eggs, or tofu.  ? Avoid pre-made and processed foods. These tend to be higher in sodium, added sugar, and fat.  · Reduce your daily sodium intake. Most people with hypertension should eat less than 1,500 mg of sodium a day.  · Do not drink alcohol if:  ? Your health care provider tells you not to drink.  ? You are pregnant, may be pregnant, or are planning to become pregnant.  · If you drink alcohol:  ? Limit how much you use to:  § 0-1 drink a day for women.  § 0-2 drinks a day for men.  ? Be aware of how much alcohol is in your drink. In the [place] one drink equals one 12 oz bottle of beer (355 mL), one 5 oz glass of wine (148 mL), or one 1½ oz glass of hard liquor (44 mL).  Lifestyle    · Work with your health care provider to maintain a healthy body weight or to lose  weight. Ask what an ideal weight is for you.  · Get at least 30 minutes of exercise most days of the week. Activities may include walking, swimming, or biking.  · Include exercise to strengthen your muscles (resistance exercise), such as Pilates or lifting weights, as part of your weekly exercise routine. Try to do these types of exercises for 30 minutes at least 3 days a week.  · Do not use any products that contain nicotine or tobacco, such as cigarettes, e-cigarettes, and chewing tobacco. If you need help quitting, ask your health care provider.  · Monitor your blood pressure at home as told by your health care provider.  · Keep all follow-up visits as told by your health care provider. This is important.  Medicines  · Take over-the-counter and prescription medicines only as told by your health care provider. Follow directions carefully. Blood pressure medicines must be taken as prescribed.  · Do not skip doses of blood pressure medicine. Doing this puts you at risk for problems and can make the medicine less effective.  · Ask your health care provider about side effects or reactions to medicines that you should watch for.  Contact a health care provider if you:  · Think you are having a reaction to a medicine you are taking.  · Have headaches that keep coming back (recurring).  · Feel dizzy.  · Have swelling in your ankles.  · Have trouble with your vision.  Get help right away if you:  · Develop a severe headache or confusion.  · Have unusual weakness or numbness.  · Feel faint.  · Have severe pain in your chest or abdomen.  · Vomit repeatedly.  · Have trouble breathing.  Summary  · Hypertension is when the force of blood pumping through your arteries is too strong. If this condition is not controlled, it may put you at risk for serious complications.  · Your personal target blood pressure may vary depending on your medical conditions, your age, and other factors. For most people, a normal blood pressure is  less than 120/80.  · Hypertension is treated with lifestyle changes, medicines, or a combination of both. Lifestyle changes include losing weight, eating a healthy, low-sodium diet, exercising more, and limiting alcohol.  This information is not intended to replace advice given to you by your health care provider. Make sure you discuss any questions you have with your health care provider.  Document Released: 12/18/2006 Document Revised: 08/28/2019 Document Reviewed: 08/28/2019  Elsevier Patient Education © 2020 [employer]

## 2020-09-10 NOTE — TELEPHONE ENCOUNTER
Please arrange for blood pressure check tomorrow when she comes in for her stress test.  This is with her home device as well.

## 2020-09-10 NOTE — NURSING NOTE
Called Dr. Root's office and spoke to Didi -in Dr. Root's office and informed her that pt's CTA Chest-Aorta was negative per Dr. Tolliver. Didi stated she would have to talk to Dr. Root and call RN in triage back after she talks with Dr. Root.

## 2020-09-10 NOTE — PROGRESS NOTES
"Subjective   Jacqueline Mcdowell is a 49 y.o. female.     History of Present Illness   Patient here for chest pain and pressure that radiates to her \"scapula\". She reports that the pain has been going on for about 3 weeks intermittently. Pain is worse with rest.  Pt was started on antihypertensive at last visit but denied any cardiac symptoms. She was supposed to come back for follow-up, but did not.  She reports that she is taking her medications daily and that her bp at home is \"normal\".  Pt's blood pressure is elevated at visit and she is reporting chest pain on the \"right side of her chest\". She states that it feels like she has been coughing a lot, but has not been coughing. Last EKG was on 7/20/2020.     The following portions of the patient's history were reviewed and updated as appropriate: allergies, current medications, past family history, past medical history, past social history, past surgical history and problem list.    Review of Systems   Constitutional: Negative for chills, fatigue and fever.   Eyes: Negative for blurred vision and double vision.   Respiratory: Positive for shortness of breath (with climbing stairs). Negative for cough and chest tightness.    Cardiovascular: Positive for chest pain. Negative for palpitations and leg swelling.   Musculoskeletal: Positive for neck pain.        Right shoulder and shoulder blade   Neurological: Positive for headache. Negative for dizziness and weakness.       Objective   Physical Exam   Constitutional: She is oriented to person, place, and time. She appears well-developed and well-nourished.   HENT:   Head: Normocephalic and atraumatic.   Eyes: Pupils are equal, round, and reactive to light. Conjunctivae and EOM are normal.   Neck: Normal range of motion. Neck supple. No thyromegaly present.   Cardiovascular: Normal rate, regular rhythm, normal heart sounds, intact distal pulses and normal pulses. Exam reveals no gallop and no friction rub.   No murmur " "heard.  Pulmonary/Chest: Effort normal and breath sounds normal.   Lymphadenopathy:     She has no cervical adenopathy.   Neurological: She is alert and oriented to person, place, and time. No cranial nerve deficit.   Skin: Skin is warm and dry. Capillary refill takes 2 to 3 seconds.   Psychiatric: She has a normal mood and affect. Her behavior is normal. Judgment and thought content normal.   Nursing note and vitals reviewed.      Vitals:    09/10/20 1100   BP: (!) 182/100   Pulse: 91   Resp: 14   Temp: 98.9 °F (37.2 °C)   SpO2: 98%     Body mass index is 32.93 kg/m².    Procedures    Assessment/Plan   Problems Addressed this Visit     None      Visit Diagnoses     Essential hypertension    -  Primary    Relevant Medications    olmesartan-hydrochlorothiazide (Benicar HCT) 40-12.5 MG per tablet    amLODIPine (NORVASC) 5 MG tablet    Other Relevant Orders    Ambulatory Referral [facility] Pain Clinic    Atypical chest pain        Relevant Orders    Ambulatory Referral Chest Pain Clinic        Return if symptoms worsen or fail to improve.    Patient was sent directly to chest pain clinic for atypical chest pain.           Patient Instructions   If you develop chest pain, shortness of breath, increased dizziness, palpitations or a headache that will not resolve, go to ER.   Return if symptoms worsen or fail to improve.    Hypertension, Adult  High blood pressure (hypertension) is when the force of blood pumping through the arteries is too strong. The arteries are the blood vessels that carry blood from the heart throughout the body. Hypertension forces the heart to work harder to pump blood and may cause arteries to become narrow or stiff. Untreated or uncontrolled hypertension can cause a heart attack, heart failure, a stroke, kidney disease, and other problems.  A blood pressure reading consists of a higher number over a lower number. Ideally, your blood pressure should be below 120/80. The first (\"top\") number is called " "the systolic pressure. It is a measure of the pressure in your arteries as your heart beats. The second (\"bottom\") number is called the diastolic pressure. It is a measure of the pressure in your arteries as the heart relaxes.  What are the causes?  The exact cause of this condition is not known. There are some conditions that result in or are related to high blood pressure.  What increases the risk?  Some risk factors for high blood pressure are under your control. The following factors may make you more likely to develop this condition:  · Smoking.  · Having type 2 diabetes mellitus, high cholesterol, or both.  · Not getting enough exercise or physical activity.  · Being overweight.  · Having too much fat, sugar, calories, or salt (sodium) in your diet.  · Drinking too much alcohol.  Some risk factors for high blood pressure may be difficult or impossible to change. Some of these factors include:  · Having chronic kidney disease.  · Having a family history of high blood pressure.  · Age. Risk increases with age.  · Race. You may be at higher risk if you are .  · Gender. Men are at higher risk than women before age 45. After age 65, women are at higher risk than men.  · Having obstructive sleep apnea.  · Stress.  What are the signs or symptoms?  High blood pressure may not cause symptoms. Very high blood pressure (hypertensive crisis) may cause:  · Headache.  · Anxiety.  · Shortness of breath.  · Nosebleed.  · Nausea and vomiting.  · Vision changes.  · Severe chest pain.  · Seizures.  How is this diagnosed?  This condition is diagnosed by measuring your blood pressure while you are seated, with your arm resting on a flat surface, your legs uncrossed, and your feet flat on the floor. The cuff of the blood pressure monitor will be placed directly against the skin of your upper arm at the level of your heart. It should be measured at least twice using the same arm. Certain conditions can cause a " difference in blood pressure between your right and left arms.  Certain factors can cause blood pressure readings to be lower or higher than normal for a short period of time:  · When your blood pressure is higher when you are in a health care provider's office than when you are at home, this is called white coat hypertension. Most people with this condition do not need medicines.  · When your blood pressure is higher at home than when you are in a health care provider's office, this is called masked hypertension. Most people with this condition may need medicines to control blood pressure.  If you have a high blood pressure reading during one visit or you have normal blood pressure with other risk factors, you may be asked to:  · Return on a different day to have your blood pressure checked again.  · Monitor your blood pressure at home for 1 week or longer.  If you are diagnosed with hypertension, you may have other blood or imaging tests to help your health care provider understand your overall risk for other conditions.  How is this treated?  This condition is treated by making healthy lifestyle changes, such as eating healthy foods, exercising more, and reducing your alcohol intake. Your health care provider may prescribe medicine if lifestyle changes are not enough to get your blood pressure under control, and if:  · Your systolic blood pressure is above 130.  · Your diastolic blood pressure is above 80.  Your personal target blood pressure may vary depending on your medical conditions, your age, and other factors.  Follow these instructions at home:  Eating and drinking    · Eat a diet that is high in fiber and potassium, and low in sodium, added sugar, and fat. An example eating plan is called the DASH (Dietary Approaches to Stop Hypertension) diet. To eat this way:  ? Eat plenty of fresh fruits and vegetables. Try to fill one half of your plate at each meal with fruits and vegetables.  ? Eat whole grains,  such as whole-wheat pasta, brown rice, or whole-grain bread. Fill about one fourth of your plate with whole grains.  ? Eat or drink low-fat dairy products, such as skim milk or low-fat yogurt.  ? Avoid fatty cuts of meat, processed or cured meats, and poultry with skin. Fill about one fourth of your plate with lean proteins, such as fish, chicken without skin, beans, eggs, or tofu.  ? Avoid pre-made and processed foods. These tend to be higher in sodium, added sugar, and fat.  · Reduce your daily sodium intake. Most people with hypertension should eat less than 1,500 mg of sodium a day.  · Do not drink alcohol if:  ? Your health care provider tells you not to drink.  ? You are pregnant, may be pregnant, or are planning to become pregnant.  · If you drink alcohol:  ? Limit how much you use to:  § 0-1 drink a day for women.  § 0-2 drinks a day for men.  ? Be aware of how much alcohol is in your drink. In the [place] one drink equals one 12 oz bottle of beer (355 mL), one 5 oz glass of wine (148 mL), or one 1½ oz glass of hard liquor (44 mL).  Lifestyle    · Work with your health care provider to maintain a healthy body weight or to lose weight. Ask what an ideal weight is for you.  · Get at least 30 minutes of exercise most days of the week. Activities may include walking, swimming, or biking.  · Include exercise to strengthen your muscles (resistance exercise), such as Pilates or lifting weights, as part of your weekly exercise routine. Try to do these types of exercises for 30 minutes at least 3 days a week.  · Do not use any products that contain nicotine or tobacco, such as cigarettes, e-cigarettes, and chewing tobacco. If you need help quitting, ask your health care provider.  · Monitor your blood pressure at home as told by your health care provider.  · Keep all follow-up visits as told by your health care provider. This is important.  Medicines  · Take over-the-counter and prescription medicines only as told by  your health care provider. Follow directions carefully. Blood pressure medicines must be taken as prescribed.  · Do not skip doses of blood pressure medicine. Doing this puts you at risk for problems and can make the medicine less effective.  · Ask your health care provider about side effects or reactions to medicines that you should watch for.  Contact a health care provider if you:  · Think you are having a reaction to a medicine you are taking.  · Have headaches that keep coming back (recurring).  · Feel dizzy.  · Have swelling in your ankles.  · Have trouble with your vision.  Get help right away if you:  · Develop a severe headache or confusion.  · Have unusual weakness or numbness.  · Feel faint.  · Have severe pain in your chest or abdomen.  · Vomit repeatedly.  · Have trouble breathing.  Summary  · Hypertension is when the force of blood pumping through your arteries is too strong. If this condition is not controlled, it may put you at risk for serious complications.  · Your personal target blood pressure may vary depending on your medical conditions, your age, and other factors. For most people, a normal blood pressure is less than 120/80.  · Hypertension is treated with lifestyle changes, medicines, or a combination of both. Lifestyle changes include losing weight, eating a healthy, low-sodium diet, exercising more, and limiting alcohol.  This information is not intended to replace advice given to you by your health care provider. Make sure you discuss any questions you have with your health care provider.  Document Released: 12/18/2006 Document Revised: 08/28/2019 Document Reviewed: 08/28/2019  Elsevier Patient Education © 2020 [employer]

## 2020-09-10 NOTE — NURSING NOTE
Dr. Root's assistant Didi called and stated that Dr. Root stated that pt can go home and to tell pt to return tomorrow am for stress test.

## 2020-09-10 NOTE — PROGRESS NOTES
Date of Office Visit: 09/10/2020  Encounter Provider: Renuka Root MD  Place of Service: [facility] CARDIOLOGY  Patient Name: Jacqueline Mcdowell  :1970    Chief complaint  Patient is referred by VIET Real for evaluation of chest pain    History of Present Illness  Patient is a 49-year-old female with history of hypertension in 2020 2 amlodipine with improvement in pressures at home.  Over the past month she started back exercising and is currently walking 2 miles an hour.  She states that approximately 3 to 4 weeks ago prior to her right-sided breast biopsy she noted the onset of chest pain that was more lateral.  However since then she has had almost constant chest pain that is radiating from her back at her shoulder blade to the anterior chest and an unrelenting manner with brief episodes of relief for 5 to 10 minutes.  It does not radiate to her arms or jaw she is not short of breath with that it is not exertionally exacerbated.  It may be slightly emotionally exacerbated.  Her blood pressure readings at home have been normal and much lower than they are today.  She states occasionally has a time taking deep breath but it is not pleuritic in nature.  It is not prandial in nature.  It is not positional in nature other than it is slightly better when she lays down.  Day at VIET Real's office (182/100) in our office it is lower at 145/93.  She feels as if she has to cough but has not been coughing.     Past Medical History:   Diagnosis Date   • Hyperlipidemia    • Hypertension      Past Surgical History:   Procedure Laterality Date   • BREAST BIOPSY Bilateral    • ESSURE TUBAL LIGATION     • FOOT SURGERY Bilateral    • KNEE ARTHROSCOPY Left        Allergies as of 09/10/2020   • (No Known Allergies)     Social History     Socioeconomic History   • Marital status:      Spouse name: Not on file   • Number of children: Not on file   • Years of education: Not on file   •  "Highest education level: Not on file   Tobacco Use   • Smoking status: Former Smoker     Packs/day: 0.25     Start date: 4/15/2017     Last attempt to quit: 3/28/2018     Years since quittin.4   • Smokeless tobacco: Never Used   Substance and Sexual Activity   • Alcohol use: Yes     Comment: rarely.   • Drug use: No   • Sexual activity: Yes     Partners: Male   Social History Narrative    Works at [employer] and part-time at [employer].      Family History   Problem Relation Age of Onset   • Diabetes Mother    • Hypertension Mother    • Hyperlipidemia Mother    • Cancer Father    • Heart disease Father    • Breast cancer Maternal Grandmother      Review of Systems   Constitution: Negative for fever, malaise/fatigue, weight gain and weight loss.   HENT: Negative for ear pain, hearing loss, nosebleeds and sore throat.    Eyes: Negative for double vision, pain, vision loss in left eye and vision loss in right eye.   Cardiovascular: Positive for chest pain.        See history of present illness.   Respiratory: Negative for cough, shortness of breath, sleep disturbances due to breathing, snoring and wheezing.    Endocrine: Negative for cold intolerance, heat intolerance and polyuria.   Skin: Negative for itching, poor wound healing and rash.   Musculoskeletal: Negative for joint pain, joint swelling and myalgias.   Gastrointestinal: Negative for abdominal pain, diarrhea, hematochezia, nausea and vomiting.   Genitourinary: Negative for hematuria and hesitancy.   Neurological: Negative for numbness, paresthesias and seizures.   Psychiatric/Behavioral: Negative for depression. The patient is not nervous/anxious.         Objective:     Vitals:    09/10/20 1150 09/10/20 1434   BP: 145/93 130/82   BP Location: Right arm Right arm   Patient Position: Sitting Sitting   Pulse: 98 70   Temp: 98.2 °F (36.8 °C)    TempSrc: Oral    SpO2: 97%    Weight: 99.8 kg (220 lb)    Height: 175.3 cm (69\")      Body mass index is 32.49 " kg/m².    Physical Exam   Constitutional: She is oriented to person, place, and time. She appears well-developed and well-nourished.   Obese   HENT:   Head: Normocephalic.   Nose: Nose normal.   Mouth/Throat: Oropharynx is clear and moist.   Eyes: Pupils are equal, round, and reactive to light. Conjunctivae and EOM are normal. Right eye exhibits no discharge. No scleral icterus.   Neck: Normal range of motion. Neck supple. No JVD present. No thyromegaly present.   Cardiovascular: Normal rate, regular rhythm, normal heart sounds and intact distal pulses. Exam reveals no gallop and no friction rub.   No murmur heard.  Pulses:       Carotid pulses are 2+ on the right side, and 2+ on the left side.       Radial pulses are 2+ on the right side, and 2+ on the left side.        Femoral pulses are 2+ on the right side, and 2+ on the left side.       Popliteal pulses are 2+ on the right side, and 2+ on the left side.        Dorsalis pedis pulses are 2+ on the right side, and 2+ on the left side.        Posterior tibial pulses are 2+ on the right side, and 2+ on the left side.   Pulmonary/Chest: Effort normal and breath sounds normal. No respiratory distress. She has no wheezes. She has no rales.   Abdominal: Soft. Bowel sounds are normal. She exhibits no distension. There is no hepatosplenomegaly. There is no tenderness. There is no rebound.   Musculoskeletal: Normal range of motion. She exhibits no edema or tenderness.   Neurological: She is alert and oriented to person, place, and time.   Skin: Skin is warm and dry. No rash noted. No erythema.   Psychiatric: She has a normal mood and affect. Her behavior is normal. Judgment and thought content normal.   Vitals reviewed.    Lab Review:     ECG 12 Lead  Date/Time: 9/10/2020 2:34 PM  Performed by: Renuka Root MD  Authorized by: Renuka Root MD   Comparison: compared with previous ECG   Similar to previous ECG  Rhythm: sinus rhythm  Other findings: non-specific ST-T wave  changes    Clinical impression: abnormal EKG          Assessment:       Diagnosis Plan   1. Chest pain, unspecified type  Cardiac Monitoring    Vital Signs - Once    Vital Signs - As Needed    Pulse Oximetry    Oxygen Therapy- Nasal Cannula; Titrate for SPO2: 92%, equal to or greater than    Insert Peripheral IV    sodium chloride 0.9 % flush 10 mL    nitroglycerin (NITROSTAT) SL tablet 0.4 mg    NPO Diet    Bathroom Privileges With Assistance    CBC & Differential    Comprehensive Metabolic Panel    Troponin T    D-Dimer    proBNP    ECG 12 Lead    Cardiac Monitoring    Vital Signs - Once    Insert Peripheral IV    NPO Diet    Bathroom Privileges With Assistance    Troponin T    Troponin T    D-Dimer    D-Dimer    proBNP    proBNP    CBC Auto Differential    Adult Stress Echo W/ Cont or Stress Agent if Necessary Per Protocol    CT Angiogram Chest With & Without Contrast   2. Shortness of breath  Cardiac Monitoring    Vital Signs - Once    Vital Signs - As Needed    Pulse Oximetry    Oxygen Therapy- Nasal Cannula; Titrate for SPO2: 92%, equal to or greater than    Insert Peripheral IV    sodium chloride 0.9 % flush 10 mL    nitroglycerin (NITROSTAT) SL tablet 0.4 mg    NPO Diet    Bathroom Privileges With Assistance    CBC & Differential    Comprehensive Metabolic Panel    Troponin T    D-Dimer    proBNP    ECG 12 Lead    Cardiac Monitoring    Vital Signs - Once    Insert Peripheral IV    NPO Diet    Bathroom Privileges With Assistance    Troponin T    Troponin T    D-Dimer    D-Dimer    proBNP    proBNP    CBC Auto Differential    Adult Stress Echo W/ Cont or Stress Agent if Necessary Per Protocol    CT Angiogram Chest With & Without Contrast   3. Essential hypertension  Cardiac Monitoring    Vital Signs - Once    Vital Signs - As Needed    Pulse Oximetry    Oxygen Therapy- Nasal Cannula; Titrate for SPO2: 92%, equal to or greater than    Insert Peripheral IV    sodium chloride 0.9 % flush 10 mL    nitroglycerin  (NITROSTAT) SL tablet 0.4 mg    NPO Diet    Bathroom Privileges With Assistance    CBC & Differential    Comprehensive Metabolic Panel    Troponin T    D-Dimer    proBNP    ECG 12 Lead    Cardiac Monitoring    Vital Signs - Once    Insert Peripheral IV    NPO Diet    Bathroom Privileges With Assistance    Troponin T    Troponin T    D-Dimer    D-Dimer    proBNP    proBNP    CBC Auto Differential    Adult Stress Echo W/ Cont or Stress Agent if Necessary Per Protocol    CT Angiogram Chest With & Without Contrast     Plan:       1.  Chest pain.  New in onset and intense.  It is between her shoulder blades to her anterior chest.  Will check a CT angiogram to assess for aortic pathology.  She will also start Protonix and follow-up with VIET Real for further GI evaluation if this is unremarkable.    2.  Abnormal EKG.  Will check a stress echocardiogram blood pressure on the right 130/82 on the left 135/91  3.  Hypertension.  Observe on her current regimen though she will start checking her pressures more closely.  4.  Questionable allergy to shrimp.  Though she states she has had shrimp since then and she has not had an allergic reaction.  She describes a priority is being a nausea.  She denies any rash or throat swelling shortness of breath.       Your medication list      ASK your doctor about these medications      Instructions Last Dose Given Next Dose Due   amLODIPine 5 MG tablet  Commonly known as:  NORVASC      Take 1 tablet by mouth Daily.       olmesartan-hydrochlorothiazide 40-12.5 MG per tablet  Commonly known as:  Benicar HCT      Take 1 tablet by mouth Daily.       valACYclovir 500 MG tablet  Commonly known as:  Valtrex      Take 1 tablet by mouth Daily. For active breakout take 1 po tid              Patient is no longer taking -.  I corrected the med list to reflect this.  I did not stop these medications.    Dictated utilizing Dragon dictation

## 2020-09-10 NOTE — TELEPHONE ENCOUNTER
STP, she is aware to bring her BP cuff tomorrow and she will have a BP check.    Thank you  Gretchen MARTINO

## 2020-09-11 ENCOUNTER — HOSPITAL ENCOUNTER (OUTPATIENT)
Dept: CARDIOLOGY | Facility: HOSPITAL | Age: 50
Discharge: HOME | End: 2020-09-11
Admitting: INTERNAL MEDICINE

## 2020-09-11 ENCOUNTER — CLINICAL SUPPORT (OUTPATIENT)
Dept: CARDIOLOGY | Facility: CLINIC | Age: 50
End: 2020-09-11

## 2020-09-11 ENCOUNTER — TELEPHONE (OUTPATIENT)
Dept: CARDIOLOGY | Facility: CLINIC | Age: 50
End: 2020-09-11

## 2020-09-11 VITALS
BODY MASS INDEX: 32.58 KG/M2 | SYSTOLIC BLOOD PRESSURE: 132 MMHG | HEART RATE: 94 BPM | HEIGHT: 69 IN | DIASTOLIC BLOOD PRESSURE: 82 MMHG | WEIGHT: 220 LBS

## 2020-09-11 VITALS — SYSTOLIC BLOOD PRESSURE: 146 MMHG | DIASTOLIC BLOOD PRESSURE: 94 MMHG

## 2020-09-11 LAB
BH CV ECHO MEAS - ACS: 1.8 CM
BH CV ECHO MEAS - AO MAX PG: 10.2 MMHG
BH CV ECHO MEAS - AO V2 MAX: 159.9 CM/SEC
BH CV ECHO MEAS - BSA(HAYCOCK): 2.2 M^2
BH CV ECHO MEAS - BSA: 2.2 M^2
BH CV ECHO MEAS - BZI_BMI: 32.5 KILOGRAMS/M^2
BH CV ECHO MEAS - BZI_METRIC_HEIGHT: 175.3 CM
BH CV ECHO MEAS - BZI_METRIC_WEIGHT: 99.8 KG
BH CV ECHO MEAS - EDV(MOD-SP2): 65 ML
BH CV ECHO MEAS - EDV(MOD-SP4): 60 ML
BH CV ECHO MEAS - EDV(TEICH): 88.3 ML
BH CV ECHO MEAS - EF(CUBED): 70.8 %
BH CV ECHO MEAS - EF(MOD-BP): 69.4 %
BH CV ECHO MEAS - EF(MOD-SP2): 63.1 %
BH CV ECHO MEAS - EF(MOD-SP4): 75 %
BH CV ECHO MEAS - EF(TEICH): 62.7 %
BH CV ECHO MEAS - ESV(MOD-SP2): 24 ML
BH CV ECHO MEAS - ESV(MOD-SP4): 15 ML
BH CV ECHO MEAS - ESV(TEICH): 32.9 ML
BH CV ECHO MEAS - FS: 33.7 %
BH CV ECHO MEAS - IVS/LVPW: 0.99
BH CV ECHO MEAS - IVSD: 1.1 CM
BH CV ECHO MEAS - LAT PEAK E' VEL: 12.5 CM/SEC
BH CV ECHO MEAS - LV DIASTOLIC VOL/BSA (35-75): 27.9 ML/M^2
BH CV ECHO MEAS - LV MASS(C)D: 176.3 GRAMS
BH CV ECHO MEAS - LV MASS(C)DI: 81.9 GRAMS/M^2
BH CV ECHO MEAS - LV SYSTOLIC VOL/BSA (12-30): 7 ML/M^2
BH CV ECHO MEAS - LVIDD: 4.4 CM
BH CV ECHO MEAS - LVIDS: 2.9 CM
BH CV ECHO MEAS - LVLD AP2: 7.8 CM
BH CV ECHO MEAS - LVLD AP4: 6.8 CM
BH CV ECHO MEAS - LVLS AP2: 6.7 CM
BH CV ECHO MEAS - LVLS AP4: 5.7 CM
BH CV ECHO MEAS - LVPWD: 1.1 CM
BH CV ECHO MEAS - MED PEAK E' VEL: 10.6 CM/SEC
BH CV ECHO MEAS - MR MAX PG: 57.5 MMHG
BH CV ECHO MEAS - MR MAX VEL: 379.2 CM/SEC
BH CV ECHO MEAS - MV A DUR: 0.13 SEC
BH CV ECHO MEAS - MV A MAX VEL: 62.1 CM/SEC
BH CV ECHO MEAS - MV DEC SLOPE: 307.9 CM/SEC^2
BH CV ECHO MEAS - MV DEC TIME: 0.21 SEC
BH CV ECHO MEAS - MV E MAX VEL: 77.6 CM/SEC
BH CV ECHO MEAS - MV E/A: 1.2
BH CV ECHO MEAS - MV P1/2T MAX VEL: 75 CM/SEC
BH CV ECHO MEAS - MV P1/2T: 71.3 MSEC
BH CV ECHO MEAS - MVA P1/2T LCG: 2.9 CM^2
BH CV ECHO MEAS - MVA(P1/2T): 3.1 CM^2
BH CV ECHO MEAS - PULM A REVS DUR: 0.13 SEC
BH CV ECHO MEAS - PULM A REVS VEL: 29.1 CM/SEC
BH CV ECHO MEAS - PULM DIAS VEL: 39 CM/SEC
BH CV ECHO MEAS - PULM S/D: 1.5
BH CV ECHO MEAS - PULM SYS VEL: 57.8 CM/SEC
BH CV ECHO MEAS - SI(CUBED): 28.3 ML/M^2
BH CV ECHO MEAS - SI(MOD-SP2): 19.1 ML/M^2
BH CV ECHO MEAS - SI(MOD-SP4): 20.9 ML/M^2
BH CV ECHO MEAS - SI(TEICH): 25.8 ML/M^2
BH CV ECHO MEAS - SV(CUBED): 60.9 ML
BH CV ECHO MEAS - SV(MOD-SP2): 41 ML
BH CV ECHO MEAS - SV(MOD-SP4): 45 ML
BH CV ECHO MEAS - SV(TEICH): 55.4 ML
BH CV ECHO MEAS - TR MAX VEL: 171 CM/SEC
BH CV ECHO MEASUREMENTS AVERAGE E/E' RATIO: 6.72
BH CV STRESS BP STAGE 1: NORMAL
BH CV STRESS BP STAGE 2: NORMAL
BH CV STRESS DURATION MIN STAGE 1: 3
BH CV STRESS DURATION MIN STAGE 2: 3
BH CV STRESS DURATION SEC STAGE 1: 0
BH CV STRESS DURATION SEC STAGE 2: 0
BH CV STRESS ECHO POST STRESS EJECTION FRACTION EF: 76 %
BH CV STRESS GRADE STAGE 1: 10
BH CV STRESS GRADE STAGE 2: 12
BH CV STRESS HR STAGE 1: 141
BH CV STRESS HR STAGE 2: 169
BH CV STRESS METS STAGE 1: 5
BH CV STRESS METS STAGE 2: 7.5
BH CV STRESS PROTOCOL 1: NORMAL
BH CV STRESS SPEED STAGE 1: 1.7
BH CV STRESS SPEED STAGE 2: 2.5
BH CV STRESS STAGE 1: 1
BH CV STRESS STAGE 2: 2
BH CV XLRA - RV BASE: 3.4 CM
BH CV XLRA - RV LENGTH: 7.2 CM
BH CV XLRA - RV MID: 2.7 CM
BH CV XLRA - TDI S': 11.3 CM/SEC
MAXIMAL PREDICTED HEART RATE: 172 BPM
PERCENT MAX PREDICTED HR: 98.26 %
STRESS BASELINE BP: NORMAL MMHG
STRESS BASELINE HR: 94 BPM
STRESS PERCENT HR: 116 %
STRESS POST ESTIMATED WORKLOAD: 7 METS
STRESS POST EXERCISE DUR MIN: 6 MIN
STRESS POST EXERCISE DUR SEC: 0 SEC
STRESS POST PEAK BP: NORMAL MMHG
STRESS POST PEAK HR: 169 BPM
STRESS TARGET HR: 146 BPM

## 2020-09-11 PROCEDURE — 93352 ADMIN ECG CONTRAST AGENT: CPT | Performed by: INTERNAL MEDICINE

## 2020-09-11 PROCEDURE — 93018 CV STRESS TEST I&R ONLY: CPT | Performed by: INTERNAL MEDICINE

## 2020-09-11 PROCEDURE — 93320 DOPPLER ECHO COMPLETE: CPT | Performed by: INTERNAL MEDICINE

## 2020-09-11 PROCEDURE — 93325 DOPPLER ECHO COLOR FLOW MAPG: CPT

## 2020-09-11 PROCEDURE — 93350 STRESS TTE ONLY: CPT

## 2020-09-11 PROCEDURE — 93320 DOPPLER ECHO COMPLETE: CPT

## 2020-09-11 PROCEDURE — 93350 STRESS TTE ONLY: CPT | Performed by: INTERNAL MEDICINE

## 2020-09-11 PROCEDURE — 93325 DOPPLER ECHO COLOR FLOW MAPG: CPT | Performed by: INTERNAL MEDICINE

## 2020-09-11 PROCEDURE — 93016 CV STRESS TEST SUPVJ ONLY: CPT | Performed by: INTERNAL MEDICINE

## 2020-09-11 PROCEDURE — 25010000002 PERFLUTREN (DEFINITY) 8.476 MG IN SODIUM CHLORIDE 0.9 % 10 ML INJECTION: Performed by: INTERNAL MEDICINE

## 2020-09-11 PROCEDURE — 93017 CV STRESS TEST TRACING ONLY: CPT

## 2020-09-11 RX ADMIN — PERFLUTREN 3 ML: 6.52 INJECTION, SUSPENSION INTRAVENOUS at 14:05

## 2020-09-11 NOTE — PROGRESS NOTES
Patient was seen in the office today for a B/P check and echo/stress test. Today her B/P manual was 148/92(LA), 146/94(RA), she also brought in her wrist cuff 144/91(left wrist). I consulted with Estephania LLANOS. She believed that patients wrist cuff was accurate, I gave her a B/P log so she could keep track of her B/P for a week she can upload through [employer]. Estephania would like patient to stay away from high salted foods, and to call with anymore problems or issues. I reviewed medications and allergies no changes made.

## 2020-09-11 NOTE — TELEPHONE ENCOUNTER
Please let patient know that her stress echo was overall normal.  Heart is pumping strong and relaxing well.  No significant valvular issues noted.  There were no signs of tightly blocked arteries in the heart.  Heart did not appear thick, which is good.    However her blood pressure did demonstrate a hypertensive response to exercise, which means it got quite high with exercise.  This can sometimes cause chest discomfort symptoms.  Lets have her avoid salty foods but also increase amlodipine to 1.5 tablets daily, monitor heart rate and blood pressure at least 1 to 2 hours after morning medications and after sitting calmly 10 minutes, keep a log, and call with updated readings in 1 week.  Call sooner for systolic blood pressure less than 100 or other issues or concerns.  Would have her follow-up with primary care to discuss possible noncardiac causes of chest discomfort such as possible GI issues.  Would also have her schedule a 3-month follow-up appointment with me in the office but call sooner for new or worsening issues and call in 1 week with updated blood pressure readings, as above.

## 2020-09-14 NOTE — TELEPHONE ENCOUNTER
Patient notified of results and recommendations and verbalized understanding  Debbie Marrufo RN  Triage nurse

## 2020-09-21 ENCOUNTER — TELEPHONE (OUTPATIENT)
Dept: CARDIOLOGY | Facility: CLINIC | Age: 50
End: 2020-09-21

## 2020-09-21 DIAGNOSIS — R06.02 SHORTNESS OF BREATH: ICD-10-CM

## 2020-09-21 DIAGNOSIS — R07.9 CHEST PAIN, UNSPECIFIED TYPE: Primary | ICD-10-CM

## 2020-09-24 ENCOUNTER — TELEPHONE (OUTPATIENT)
Dept: CARDIOLOGY | Facility: CLINIC | Age: 50
End: 2020-09-24

## 2020-09-24 RX ORDER — AMLODIPINE BESYLATE 5 MG/1
7.5 TABLET ORAL DAILY
Qty: 45 TABLET | Refills: 5 | Status: SHIPPED | OUTPATIENT
Start: 2020-09-24 | End: 2021-02-23 | Stop reason: SDUPTHER

## 2020-09-24 NOTE — TELEPHONE ENCOUNTER
Spoke with patient. She reports that she has received results of CT chest and stress echo previously. She has been doing well with no real chest pain since improved blood pressure values on 1.5 tablet daily amlodipine.  That is a total of 7.5 mg daily.  She is requested a new prescription at her pharmacy.  She plans to call with any further questions or concerns.      Kyra- please arrange 6 month follow up with PHILLIP or LESLYE.

## 2021-02-08 RX ORDER — OLMESARTAN MEDOXOMIL AND HYDROCHLOROTHIAZIDE 40/12.5 40; 12.5 MG/1; MG/1
1 TABLET ORAL DAILY
Qty: 30 TABLET | Refills: 3 | Status: SHIPPED | OUTPATIENT
Start: 2021-02-08 | End: 2021-07-09 | Stop reason: SDUPTHER

## 2021-02-12 DIAGNOSIS — Z13.1 DIABETES MELLITUS SCREENING: ICD-10-CM

## 2021-02-12 DIAGNOSIS — Z13.220 LIPID SCREENING: ICD-10-CM

## 2021-02-12 DIAGNOSIS — I10 ESSENTIAL HYPERTENSION: ICD-10-CM

## 2021-02-12 DIAGNOSIS — Z11.59 SCREENING EXAMINATION FOR POLIOMYELITIS: Primary | ICD-10-CM

## 2021-02-15 ENCOUNTER — APPOINTMENT (OUTPATIENT)
Dept: ULTRASOUND IMAGING | Facility: HOSPITAL | Age: 51
End: 2021-02-15

## 2021-02-15 ENCOUNTER — HOSPITAL ENCOUNTER (OUTPATIENT)
Dept: ULTRASOUND IMAGING | Facility: HOSPITAL | Age: 51
Discharge: HOME | End: 2021-02-15

## 2021-02-15 DIAGNOSIS — R92.8 ABNORMAL MAMMOGRAM: ICD-10-CM

## 2021-02-15 DIAGNOSIS — N63.10 BREAST MASS, RIGHT: Primary | ICD-10-CM

## 2021-02-15 PROCEDURE — 76641 ULTRASOUND BREAST COMPLETE: CPT

## 2021-02-23 RX ORDER — AMLODIPINE BESYLATE 5 MG/1
7.5 TABLET ORAL DAILY
Qty: 45 TABLET | Refills: 0 | Status: SHIPPED | OUTPATIENT
Start: 2021-02-23 | End: 2021-06-17

## 2021-02-23 NOTE — TELEPHONE ENCOUNTER
Caller: Jacqueline Mcdowell    Relationship: Self    Best call back number: [phone]    Medication needed:   Requested Prescriptions     Pending Prescriptions Disp Refills   • amLODIPine (NORVASC) 5 MG tablet 45 tablet 5     Sig: Take 1.5 tablets by mouth Daily.       When do you need the refill by: ASAP    What details did the patient provide when requesting the medication: COMPLETELY     Does the patient have less than a 3 day supply:  [x] Yes  [] No    What is the patient's preferred pharmacy: [facility] [place] [address] [address] [facility] [address]  - [phone] [facility] [phone] FX

## 2021-02-24 ENCOUNTER — TELEPHONE (OUTPATIENT)
Dept: FAMILY MEDICINE CLINIC | Facility: CLINIC | Age: 51
End: 2021-02-24

## 2021-02-24 NOTE — TELEPHONE ENCOUNTER
The pt stated she is not taking the half.She called about 15 minutes ago with her insurance on the phone. The will be sending a PA over so we can complete

## 2021-02-24 NOTE — TELEPHONE ENCOUNTER
Caller: Jacqueline Mcdowell    Relationship to patient: Self    Best call back number: [phone]     Concerns or Questions if Applicable: Patient is calling to state the Insurance has denied her RX for the following medication due to needing a PA.  She states she has not had any medication for over a week.  She is asking if she can get a call when the PA is obtained.    Please advise.

## 2021-02-25 DIAGNOSIS — I10 ESSENTIAL HYPERTENSION: ICD-10-CM

## 2021-02-25 DIAGNOSIS — I10 ESSENTIAL HYPERTENSION: Primary | ICD-10-CM

## 2021-02-25 RX ORDER — AMLODIPINE BESYLATE 5 MG/1
5 TABLET ORAL DAILY
Qty: 30 TABLET | Refills: 5 | Status: SHIPPED | OUTPATIENT
Start: 2021-02-25 | End: 2021-07-23 | Stop reason: SDUPTHER

## 2021-02-25 RX ORDER — AMLODIPINE BESYLATE 5 MG/1
5 TABLET ORAL DAILY
Qty: 30 TABLET | Refills: 5 | OUTPATIENT
Start: 2021-02-25

## 2021-02-25 RX ORDER — AMLODIPINE BESYLATE 5 MG/1
5 TABLET ORAL DAILY
Qty: 30 TABLET | Refills: 0 | Status: CANCELLED | OUTPATIENT
Start: 2021-02-25

## 2021-03-08 ENCOUNTER — APPOINTMENT (OUTPATIENT)
Dept: ULTRASOUND IMAGING | Facility: HOSPITAL | Age: 51
End: 2021-03-08

## 2021-04-06 ENCOUNTER — BULK ORDERING (OUTPATIENT)
Dept: CASE MANAGEMENT | Facility: OTHER | Age: 51
End: 2021-04-06

## 2021-04-06 DIAGNOSIS — Z23 IMMUNIZATION DUE: ICD-10-CM

## 2021-04-12 ENCOUNTER — OFFICE (OUTPATIENT)
Dept: URBAN - METROPOLITAN AREA CLINIC 66 | Facility: CLINIC | Age: 51
End: 2021-04-12

## 2021-04-12 DIAGNOSIS — R14.2 ERUCTATION: ICD-10-CM

## 2021-04-12 DIAGNOSIS — Z83.71 FAMILY HISTORY OF COLONIC POLYPS: ICD-10-CM

## 2021-04-12 DIAGNOSIS — K21.9 GASTRO-ESOPHAGEAL REFLUX DISEASE WITHOUT ESOPHAGITIS: ICD-10-CM

## 2021-04-12 PROCEDURE — 99244 OFF/OP CNSLTJ NEW/EST MOD 40: CPT | Performed by: NURSE PRACTITIONER

## 2021-04-12 RX ORDER — FAMOTIDINE 40 MG/1
40 TABLET, FILM COATED ORAL
Qty: 30 | Refills: 11 | Status: ACTIVE
Start: 2021-04-12

## 2021-05-25 ENCOUNTER — TRANSCRIBE ORDERS (OUTPATIENT)
Dept: SLEEP MEDICINE | Facility: HOSPITAL | Age: 51
End: 2021-05-25

## 2021-05-25 DIAGNOSIS — Z01.818 OTHER SPECIFIED PRE-OPERATIVE EXAMINATION: Primary | ICD-10-CM

## 2021-06-01 ENCOUNTER — TRANSCRIBE ORDERS (OUTPATIENT)
Dept: SLEEP MEDICINE | Facility: HOSPITAL | Age: 51
End: 2021-06-01

## 2021-06-01 DIAGNOSIS — Z01.818 OTHER SPECIFIED PRE-OPERATIVE EXAMINATION: Primary | ICD-10-CM

## 2021-06-16 ENCOUNTER — LAB (OUTPATIENT)
Dept: LAB | Facility: HOSPITAL | Age: 51
End: 2021-06-16

## 2021-06-16 ENCOUNTER — APPOINTMENT (OUTPATIENT)
Dept: LAB | Facility: HOSPITAL | Age: 51
End: 2021-06-16

## 2021-06-16 DIAGNOSIS — Z01.818 OTHER SPECIFIED PRE-OPERATIVE EXAMINATION: ICD-10-CM

## 2021-06-16 PROCEDURE — C9803 HOPD COVID-19 SPEC COLLECT: HCPCS

## 2021-06-16 PROCEDURE — U0004 COV-19 TEST NON-CDC HGH THRU: HCPCS

## 2021-06-17 LAB — SARS-COV-2 ORF1AB RESP QL NAA+PROBE: NOT DETECTED

## 2021-06-17 RX ORDER — FERROUS SULFATE 325(65) MG
325 TABLET ORAL
COMMUNITY
End: 2021-07-02

## 2021-06-18 ENCOUNTER — ON CAMPUS - OUTPATIENT (OUTPATIENT)
Dept: URBAN - METROPOLITAN AREA HOSPITAL 114 | Facility: HOSPITAL | Age: 51
End: 2021-06-18
Payer: COMMERCIAL

## 2021-06-18 ENCOUNTER — ANESTHESIA (OUTPATIENT)
Dept: GASTROENTEROLOGY | Facility: HOSPITAL | Age: 51
End: 2021-06-18

## 2021-06-18 ENCOUNTER — HOSPITAL ENCOUNTER (OUTPATIENT)
Facility: HOSPITAL | Age: 51
Setting detail: HOSPITAL OUTPATIENT SURGERY
Discharge: HOME | End: 2021-06-18
Attending: INTERNAL MEDICINE | Admitting: INTERNAL MEDICINE

## 2021-06-18 ENCOUNTER — ANESTHESIA EVENT (OUTPATIENT)
Dept: GASTROENTEROLOGY | Facility: HOSPITAL | Age: 51
End: 2021-06-18

## 2021-06-18 ENCOUNTER — ON CAMPUS - OUTPATIENT (OUTPATIENT)
Dept: URBAN - METROPOLITAN AREA HOSPITAL 114 | Facility: HOSPITAL | Age: 51
End: 2021-06-18

## 2021-06-18 VITALS
DIASTOLIC BLOOD PRESSURE: 77 MMHG | TEMPERATURE: 97.8 F | HEIGHT: 69 IN | WEIGHT: 221 LBS | BODY MASS INDEX: 32.73 KG/M2 | SYSTOLIC BLOOD PRESSURE: 121 MMHG | OXYGEN SATURATION: 99 % | RESPIRATION RATE: 16 BRPM | HEART RATE: 68 BPM

## 2021-06-18 DIAGNOSIS — K21.9 GERD (GASTROESOPHAGEAL REFLUX DISEASE): ICD-10-CM

## 2021-06-18 DIAGNOSIS — K29.60 OTHER GASTRITIS WITHOUT BLEEDING: ICD-10-CM

## 2021-06-18 DIAGNOSIS — K44.9 DIAPHRAGMATIC HERNIA WITHOUT OBSTRUCTION OR GANGRENE: ICD-10-CM

## 2021-06-18 DIAGNOSIS — K21.9 GASTRO-ESOPHAGEAL REFLUX DISEASE WITHOUT ESOPHAGITIS: ICD-10-CM

## 2021-06-18 DIAGNOSIS — Z12.11 ENCOUNTER FOR SCREENING FOR MALIGNANT NEOPLASM OF COLON: ICD-10-CM

## 2021-06-18 LAB
B-HCG UR QL: NEGATIVE
INTERNAL NEGATIVE CONTROL: NORMAL
INTERNAL POSITIVE CONTROL: NORMAL
Lab: NORMAL

## 2021-06-18 PROCEDURE — 25010000002 PROPOFOL 10 MG/ML EMULSION: Performed by: ANESTHESIOLOGY

## 2021-06-18 PROCEDURE — 81025 URINE PREGNANCY TEST: CPT | Performed by: INTERNAL MEDICINE

## 2021-06-18 PROCEDURE — 43239 EGD BIOPSY SINGLE/MULTIPLE: CPT | Performed by: INTERNAL MEDICINE

## 2021-06-18 PROCEDURE — 88305 TISSUE EXAM BY PATHOLOGIST: CPT | Performed by: INTERNAL MEDICINE

## 2021-06-18 PROCEDURE — 45378 DIAGNOSTIC COLONOSCOPY: CPT | Mod: 33 | Performed by: INTERNAL MEDICINE

## 2021-06-18 RX ORDER — SODIUM CHLORIDE, SODIUM LACTATE, POTASSIUM CHLORIDE, CALCIUM CHLORIDE 600; 310; 30; 20 MG/100ML; MG/100ML; MG/100ML; MG/100ML
30 INJECTION, SOLUTION INTRAVENOUS CONTINUOUS PRN
Status: DISCONTINUED | OUTPATIENT
Start: 2021-06-18 | End: 2021-06-18 | Stop reason: HOSPADM

## 2021-06-18 RX ORDER — PROPOFOL 10 MG/ML
VIAL (ML) INTRAVENOUS CONTINUOUS PRN
Status: DISCONTINUED | OUTPATIENT
Start: 2021-06-18 | End: 2021-06-18 | Stop reason: SURG

## 2021-06-18 RX ORDER — GLYCOPYRROLATE 0.2 MG/ML
INJECTION INTRAMUSCULAR; INTRAVENOUS AS NEEDED
Status: DISCONTINUED | OUTPATIENT
Start: 2021-06-18 | End: 2021-06-18 | Stop reason: SURG

## 2021-06-18 RX ORDER — LIDOCAINE HYDROCHLORIDE 20 MG/ML
INJECTION, SOLUTION INFILTRATION; PERINEURAL AS NEEDED
Status: DISCONTINUED | OUTPATIENT
Start: 2021-06-18 | End: 2021-06-18 | Stop reason: SURG

## 2021-06-18 RX ADMIN — PROPOFOL 300 MCG/KG/MIN: 10 INJECTION, EMULSION INTRAVENOUS at 10:50

## 2021-06-18 RX ADMIN — GLYCOPYRROLATE 0.2 MG: 0.2 INJECTION INTRAMUSCULAR; INTRAVENOUS at 10:49

## 2021-06-18 RX ADMIN — SODIUM CHLORIDE, POTASSIUM CHLORIDE, SODIUM LACTATE AND CALCIUM CHLORIDE 30 ML/HR: 600; 310; 30; 20 INJECTION, SOLUTION INTRAVENOUS at 10:17

## 2021-06-18 RX ADMIN — LIDOCAINE HYDROCHLORIDE 60 MG: 20 INJECTION, SOLUTION INFILTRATION; PERINEURAL at 10:49

## 2021-06-18 NOTE — ANESTHESIA PREPROCEDURE EVALUATION
Anesthesia Evaluation     Patient summary reviewed and Nursing notes reviewed   NPO Solid Status: > 8 hours  NPO Liquid Status: > 2 hours           Airway   Mallampati: I  TM distance: >3 FB  Neck ROM: full  No difficulty expected  Dental - normal exam     Pulmonary - normal exam   (+) a smoker Former, shortness of breath,   Cardiovascular - normal exam    (+) hypertension, hyperlipidemia,     ROS comment: nl stress echo    Neuro/Psych- negative ROS  GI/Hepatic/Renal/Endo    (+)  GERD,      Musculoskeletal (-) negative ROS    Abdominal  - normal exam    Bowel sounds: normal.   Substance History - negative use     OB/GYN negative ob/gyn ROS         Other                      Anesthesia Plan    ASA 3     MAC       Anesthetic plan, all risks, benefits, and alternatives have been provided, discussed and informed consent has been obtained with: patient.

## 2021-06-18 NOTE — ANESTHESIA POSTPROCEDURE EVALUATION
"Patient: Jacqueline Mcdowell    Procedure Summary     Date: 06/18/21 Room / Location:  [facility]    Anesthesia Start: 1046 Anesthesia Stop: 1118    Procedures:       ESOPHAGOGASTRODUODENOSCOPY with biopsies (N/A Esophagus)      COLONOSCOPY into cecum and terminal ileum (N/A ) Diagnosis:     Surgeons: Timur Taylor MD Provider: Cristhian Quintero MD    Anesthesia Type: MAC ASA Status: 3          Anesthesia Type: MAC    Vitals  Vitals Value Taken Time   /77 06/18/21 1141   Temp     Pulse 68 06/18/21 1141   Resp 16 06/18/21 1141   SpO2 99 % 06/18/21 1141           Post Anesthesia Care and Evaluation      Comments: Patient discharged before being evaluated by an Anesthesiologist. No apparent complications per the record.  This case was not medically directed. I am completing this chart for medical records purposes; I personally have no medical involvement with this patient.    /77 (BP Location: Left arm, Patient Position: Lying)   Pulse 68   Temp 36.6 °C (97.8 °F) (Oral)   Resp 16   Ht 175.3 cm (69\")   Wt 100 kg (221 lb)   LMP 06/07/2021 (Exact Date)   SpO2 99%   BMI 32.64 kg/m²           "

## 2021-06-18 NOTE — H&P
[facility]   HISTORY AND PHYSICAL    Patient Name: Jacqueline Mcdowell  : 1970  MRN: [id number]  Primary Care Physician:  Cristal Real APRN  Date of admission: 2021    Subjective   Subjective     Chief Complaint: reflux and screening    History of Present Illness  Her acid reflux is much improved, she did not  and start rx for the famotidine since she was improved. No bowel issues   Review of Systems   All other systems reviewed and are negative.       Personal History     Past Medical History:   Diagnosis Date   • Acid reflux    • Hyperlipidemia    • Hypertension        Past Surgical History:   Procedure Laterality Date   • BREAST BIOPSY Bilateral    • COLONOSCOPY     • ESSURE TUBAL LIGATION     • FOOT SURGERY Bilateral    • KNEE ARTHROSCOPY Left        Family History: family history includes Breast cancer in her maternal grandmother; Cancer in her father; Diabetes in her mother; Heart disease in her father; Hyperlipidemia in her mother; Hypertension in her mother. Otherwise pertinent FHx was reviewed and not pertinent to current issue.    Social History:  reports that she quit smoking about 3 years ago. Her smoking use included cigarettes. She started smoking about 4 years ago. She smoked 0.25 packs per day. She has never used smokeless tobacco. She reports current alcohol use. She reports that she does not use drugs.    Home Medications:  amLODIPine, ferrous sulfate, olmesartan-hydrochlorothiazide, and valACYclovir    Allergies:  No Known Allergies    Objective    Objective     Vitals:   Temp:  [97.8 °F (36.6 °C)] 97.8 °F (36.6 °C)  Heart Rate:  [64] 64  Resp:  [16] 16  BP: (116)/(76) 116/76    Physical Exam  Constitutional:       Appearance: Normal appearance.   HENT:      Right Ear: External ear normal.      Left Ear: External ear normal.      Mouth/Throat:      Pharynx: Oropharynx is clear.   Eyes:      Conjunctiva/sclera: Conjunctivae normal.   Cardiovascular:      Rate and Rhythm:  Normal rate.   Pulmonary:      Effort: Pulmonary effort is normal.      Breath sounds: Normal breath sounds.   Abdominal:      General: Bowel sounds are normal.   Skin:     General: Skin is warm and dry.   Neurological:      General: No focal deficit present.   Psychiatric:         Mood and Affect: Mood normal.         Result Review    Result Review:  I have personally reviewed the results from the time of this admission to 6/18/2021 10:46 EDT and agree with these findings:  []  Laboratory  []  Microbiology  []  Radiology  []  EKG/Telemetry   []  Cardiology/Vascular   []  Pathology  []  Old records  []  Other:    Assessment/Plan   Assessment / Plan     Brief Patient Summary:  Jacqueline Mcdowell is a 50 y.o. female who   Gastroesophageal reflux disease  Age related colon cancer screening    Active Hospital Problems:  There are no active hospital problems to display for this patient.    Plan: Upper and lower tract endoscopy, risks, alternatives and benefits dicussed  with patient and patient is agreeable to proceed.      Electronically signed by Timur Taylor MD, 06/18/21, 10:46 AM EDT.

## 2021-06-21 LAB
CYTO UR: NORMAL
LAB AP CASE REPORT: NORMAL
PATH REPORT.FINAL DX SPEC: NORMAL
PATH REPORT.GROSS SPEC: NORMAL

## 2021-07-01 ENCOUNTER — TELEPHONE (OUTPATIENT)
Dept: FAMILY MEDICINE CLINIC | Facility: CLINIC | Age: 51
End: 2021-07-01

## 2021-07-01 NOTE — TELEPHONE ENCOUNTER
PATIENT'S PCP IS CURRENTLY [facility] HOWEVER SHE WOULD LIKE TO SWITCH TO [facility]. IS THAT OKAY WITH [facility]? PATIENT IS REQUESTING A SAME DAY APT TODAY BECAUSE SHE FEELS  LIKE HER EQUILIBRIUM IS OFF PLEASE CALL PATIENT AND INFORM IF THIS IS OKAY AND GET HER SCHEDULED.    PATIENT CAN BE REACHED AT:   [phone]

## 2021-07-02 ENCOUNTER — OFFICE VISIT (OUTPATIENT)
Dept: FAMILY MEDICINE CLINIC | Facility: CLINIC | Age: 51
End: 2021-07-02

## 2021-07-02 VITALS
SYSTOLIC BLOOD PRESSURE: 112 MMHG | RESPIRATION RATE: 16 BRPM | BODY MASS INDEX: 33.62 KG/M2 | DIASTOLIC BLOOD PRESSURE: 78 MMHG | HEART RATE: 71 BPM | OXYGEN SATURATION: 100 % | HEIGHT: 69 IN | WEIGHT: 227 LBS

## 2021-07-02 DIAGNOSIS — R20.0 FINGER NUMBNESS: ICD-10-CM

## 2021-07-02 DIAGNOSIS — M25.362 KNEE INSTABILITY, LEFT: Primary | ICD-10-CM

## 2021-07-02 PROCEDURE — 99213 OFFICE O/P EST LOW 20 MIN: CPT | Performed by: NURSE PRACTITIONER

## 2021-07-02 NOTE — PATIENT INSTRUCTIONS
Return if symptoms worsen or fail to improve.   Wear wrist brace at night.  Wear knee brace for stability  May use ibuprofen for pain.

## 2021-07-02 NOTE — PROGRESS NOTES
"Subjective   Jacqueline Mcdowell is a 50 y.o. female.     History of Present Illness   Patient presents with c/o tingling in middle and first fingers of her right hand. She reports that this has been going on for about 8 months. She states that the tingling after sleep and when she is holding a mouse for her computer.    Patient is also c/o \"instablity\" in her left knee. She reports that she feels like her left knee is \"tight\". She reports that her knee feels like it is going to \"go out on her\".  Patient had left knee surgery in approximately \"2015\".     The following portions of the patient's history were reviewed and updated as appropriate: allergies, current medications, past family history, past medical history, past social history, past surgical history and problem list.    Review of Systems   Constitutional: Negative for chills, fatigue and fever.   Respiratory: Negative for cough, chest tightness, shortness of breath and wheezing.    Cardiovascular: Negative for chest pain, palpitations and leg swelling.   Musculoskeletal: Negative for arthralgias, back pain, gait problem, joint swelling, myalgias, neck pain and neck stiffness.        Finger numbness right hand   Skin: Negative for dry skin.   Neurological: Positive for weakness (left knee). Negative for dizziness and headache.   Psychiatric/Behavioral: Negative.        Objective   Physical Exam  Vitals and nursing note reviewed.   Constitutional:       Appearance: She is well-developed.   HENT:      Head: Normocephalic and atraumatic.   Eyes:      Conjunctiva/sclera: Conjunctivae normal.      Pupils: Pupils are equal, round, and reactive to light.   Neck:      Thyroid: No thyromegaly.   Cardiovascular:      Rate and Rhythm: Normal rate and regular rhythm.      Heart sounds: Normal heart sounds. No murmur heard.     Pulmonary:      Effort: Pulmonary effort is normal.      Breath sounds: Normal breath sounds.   Musculoskeletal:         General: No deformity or signs " of injury. Normal range of motion.      Cervical back: Full passive range of motion without pain, normal range of motion and neck supple.      Thoracic back: Normal.      Lumbar back: Normal.      Right lower leg: No edema.      Left lower leg: No edema.   Lymphadenopathy:      Cervical: No cervical adenopathy.   Skin:     General: Skin is warm and dry.   Neurological:      Mental Status: She is alert and oriented to person, place, and time.   Psychiatric:         Behavior: Behavior normal.         Thought Content: Thought content normal.         Judgment: Judgment normal.         Vitals:    07/02/21 1406   BP: 112/78   Pulse: 71   Resp: 16   SpO2: 100%     Body mass index is 33.52 kg/m².    Procedures    Assessment/Plan   Problems Addressed this Visit     None      Visit Diagnoses     Knee instability, left    -  Primary    Relevant Orders    Ambulatory Referral to Orthopedic Surgery    Finger numbness        Relevant Orders    Ambulatory Referral to Hand Surgery      Diagnoses       Codes Comments    Knee instability, left    -  Primary ICD-10-CM: M25.362  ICD-9-CM: 718.86     Finger numbness     ICD-10-CM: R20.0  ICD-9-CM: 782.0         Referral to ortho  Patient advised to wear a wrist brace. She will purchase over the counter.   Referral to hand surgery  Wear knee brace for stability  Ibuprofen as needed for pain.        Return if symptoms worsen or fail to improve.     Patient Instructions   Return if symptoms worsen or fail to improve.   Wear wrist brace at night.  Wear knee brace for stability  May use ibuprofen for pain.

## 2021-07-09 ENCOUNTER — OFFICE VISIT (OUTPATIENT)
Dept: FAMILY MEDICINE CLINIC | Facility: CLINIC | Age: 51
End: 2021-07-09

## 2021-07-09 VITALS
SYSTOLIC BLOOD PRESSURE: 148 MMHG | DIASTOLIC BLOOD PRESSURE: 100 MMHG | RESPIRATION RATE: 16 BRPM | HEART RATE: 81 BPM | HEIGHT: 69 IN | WEIGHT: 229 LBS | BODY MASS INDEX: 33.92 KG/M2 | OXYGEN SATURATION: 98 %

## 2021-07-09 DIAGNOSIS — Z13.220 SCREENING FOR HYPERLIPIDEMIA: ICD-10-CM

## 2021-07-09 DIAGNOSIS — Z00.00 ANNUAL PHYSICAL EXAM: Primary | ICD-10-CM

## 2021-07-09 DIAGNOSIS — R42 VERTIGO: ICD-10-CM

## 2021-07-09 DIAGNOSIS — Z79.899 HIGH RISK MEDICATION USE: ICD-10-CM

## 2021-07-09 DIAGNOSIS — I10 ESSENTIAL HYPERTENSION: ICD-10-CM

## 2021-07-09 PROCEDURE — 99396 PREV VISIT EST AGE 40-64: CPT | Performed by: NURSE PRACTITIONER

## 2021-07-09 RX ORDER — OLMESARTAN MEDOXOMIL AND HYDROCHLOROTHIAZIDE 40/12.5 40; 12.5 MG/1; MG/1
1 TABLET ORAL DAILY
Qty: 90 TABLET | Refills: 1 | Status: SHIPPED | OUTPATIENT
Start: 2021-07-09 | End: 2021-07-15 | Stop reason: SDUPTHER

## 2021-07-09 RX ORDER — OLMESARTAN MEDOXOMIL AND HYDROCHLOROTHIAZIDE 40/12.5 40; 12.5 MG/1; MG/1
1 TABLET ORAL DAILY
Qty: 30 TABLET | Refills: 3 | Status: SHIPPED | OUTPATIENT
Start: 2021-07-09 | End: 2021-07-09

## 2021-07-09 NOTE — PROGRESS NOTES
Preventive Exam    History of Present Illness: Jacqueline Mcdowell is a 50 y.o. here for check up and review of routine health maintenance. she states she is doing well and has no concerns.    Patient is also being seen for c/o of light-headedness when turning her head from side to side. Patient reports that this in ongoing for about 3 weeks.  Patient is not currently taking any medications for this.     Past medical history, surgical history and family history have been reviewed.     Review of Systems   Constitutional: Negative for appetite change, chills, fatigue, fever, unexpected weight gain and unexpected weight loss.   HENT: Negative for congestion, dental problem, postnasal drip, rhinorrhea, sinus pressure and sore throat.         Dental exam is due.    Eyes: Negative.  Negative for blurred vision, double vision and photophobia.        Wears glasses. Eye exam is scheduled   Respiratory: Negative for cough, chest tightness, shortness of breath and wheezing.    Cardiovascular: Negative for chest pain, palpitations and leg swelling.   Gastrointestinal: Negative for abdominal pain, constipation, diarrhea, nausea, vomiting and GERD.   Endocrine: Negative.  Negative for cold intolerance, heat intolerance, polydipsia, polyphagia and polyuria.   Genitourinary: Negative.  Negative for breast discharge, breast lump, breast pain, menstrual problem, pelvic pain, pelvic pressure, vaginal bleeding and vaginal discharge.   Musculoskeletal: Positive for arthralgias (Left knee pain). Negative for back pain, joint swelling and myalgias.   Skin: Negative.    Allergic/Immunologic: Negative.  Negative for environmental allergies and food allergies.   Neurological: Positive for light-headedness. Negative for dizziness, weakness, numbness and headache.   Hematological: Negative.  Does not bruise/bleed easily.   Psychiatric/Behavioral: Negative.  Negative for sleep disturbance, suicidal ideas and depressed mood. The patient is not  nervous/anxious.        PHYSICAL EXAM    Vitals:    07/09/21 1508   BP: 148/100   Pulse: 81   Resp: 16   SpO2: 98%     Body mass index is 33.92 kg/m².      Physical Exam  Vitals and nursing note reviewed.   Constitutional:       Appearance: Normal appearance. She is well-developed. She is obese.   HENT:      Head: Normocephalic and atraumatic.      Right Ear: Tympanic membrane, ear canal and external ear normal.      Left Ear: Tympanic membrane, ear canal and external ear normal.      Nose: Nose normal.      Mouth/Throat:      Lips: Pink.      Mouth: Mucous membranes are moist.      Tongue: No lesions.      Palate: No mass and lesions.      Pharynx: Oropharynx is clear. Uvula midline.      Tonsils: No tonsillar exudate.   Eyes:      Conjunctiva/sclera: Conjunctivae normal.      Pupils: Pupils are equal, round, and reactive to light.   Neck:      Thyroid: No thyromegaly.   Cardiovascular:      Rate and Rhythm: Normal rate and regular rhythm.      Pulses: Normal pulses.           Dorsalis pedis pulses are 2+ on the right side and 2+ on the left side.        Posterior tibial pulses are 2+ on the right side and 2+ on the left side.      Heart sounds: Normal heart sounds. No murmur heard.     Pulmonary:      Effort: Pulmonary effort is normal.      Breath sounds: Normal breath sounds.   Abdominal:      General: Bowel sounds are normal. There is no distension.      Palpations: Abdomen is soft.      Tenderness: There is no abdominal tenderness.   Musculoskeletal:         General: No deformity. Normal range of motion.      Cervical back: Normal range of motion and neck supple.      Right lower leg: No edema.      Left lower leg: No edema.   Lymphadenopathy:      Head:      Right side of head: No submental, submandibular, tonsillar, preauricular, posterior auricular or occipital adenopathy.      Left side of head: No submental, submandibular, tonsillar, preauricular, posterior auricular or occipital adenopathy.       Cervical: No cervical adenopathy.      Right cervical: No superficial, deep or posterior cervical adenopathy.     Left cervical: No superficial, deep or posterior cervical adenopathy.      Upper Body:      Right upper body: No supraclavicular adenopathy.      Left upper body: No supraclavicular adenopathy.   Skin:     General: Skin is warm and dry.      Capillary Refill: Capillary refill takes 2 to 3 seconds.   Neurological:      General: No focal deficit present.      Mental Status: She is alert and oriented to person, place, and time.      Cranial Nerves: Cranial nerves are intact. No cranial nerve deficit.      Sensory: Sensation is intact.      Motor: Motor function is intact.      Coordination: Coordination is intact.      Gait: Gait is intact.   Psychiatric:         Attention and Perception: Attention and perception normal.         Mood and Affect: Mood and affect normal.         Speech: Speech normal.         Behavior: Behavior normal. Behavior is cooperative.         Thought Content: Thought content normal.         Cognition and Memory: Cognition and memory normal.         Judgment: Judgment normal.         Procedures    Diagnoses and all orders for this visit:    1. Annual physical exam (Primary)  -     olmesartan-hydrochlorothiazide (BENICAR HCT) 40-12.5 MG per tablet; Take 1 tablet by mouth Daily.  Dispense: 90 tablet; Refill: 1  -     CBC & Differential  -     Comprehensive Metabolic Panel  -     Lipid Panel With / Chol / HDL Ratio    2. Essential hypertension  -     Discontinue: olmesartan-hydrochlorothiazide (BENICAR HCT) 40-12.5 MG per tablet; Take 1 tablet by mouth Daily.  Dispense: 30 tablet; Refill: 3  -     olmesartan-hydrochlorothiazide (BENICAR HCT) 40-12.5 MG per tablet; Take 1 tablet by mouth Daily.  Dispense: 90 tablet; Refill: 1  -     Comprehensive Metabolic Panel    3. Screening for hyperlipidemia  -     Lipid Panel With / Chol / HDL Ratio    4. High risk medication use  -     CBC &  Differential  -     Comprehensive Metabolic Panel    5. Vertigo  -     Ambulatory Referral to ENT (Otolaryngology)        Problems Addressed this Visit        Cardiac and Vasculature    Essential hypertension    Relevant Medications    olmesartan-hydrochlorothiazide (BENICAR HCT) 40-12.5 MG per tablet    Other Relevant Orders    Comprehensive Metabolic Panel      Other Visit Diagnoses     Annual physical exam    -  Primary    Relevant Medications    olmesartan-hydrochlorothiazide (BENICAR HCT) 40-12.5 MG per tablet    Other Relevant Orders    CBC & Differential    Comprehensive Metabolic Panel    Lipid Panel With / Chol / HDL Ratio    Screening for hyperlipidemia        Relevant Orders    Lipid Panel With / Chol / HDL Ratio    High risk medication use        Relevant Orders    CBC & Differential    Comprehensive Metabolic Panel    Vertigo        Relevant Orders    Ambulatory Referral to ENT (Otolaryngology) (Completed)      Diagnoses       Codes Comments    Annual physical exam    -  Primary ICD-10-CM: Z00.00  ICD-9-CM: V70.0     Essential hypertension     ICD-10-CM: I10  ICD-9-CM: 401.9     Screening for hyperlipidemia     ICD-10-CM: Z13.220  ICD-9-CM: V77.91     High risk medication use     ICD-10-CM: Z79.899  ICD-9-CM: V58.69     Vertigo     ICD-10-CM: R42  ICD-9-CM: 780.4         Refill olmesartan  CMP  CBC  Lipid panel   Hep c screening  Referral to ENT for vertigo  Advised to try OTC antihistamine for vertigo.   Routine health maintenance reviewed and discussed with Jacqueline Mcdowell.      Return in about 6 months (around 1/9/2022) for Recheck BP, Labs.    Patient Instructions     Return in about 6 months (around 1/9/2022) for Recheck BP, Labs.  Call with any questions or concerns.  If you develop chest pain, shortness of breath, increased dizziness, palpitations or a headache that will not resolve, go to ER.   May use over the counter antihistamine.   Annual Wellness  Personal Prevention Plan of Service     Date of  Office Visit:  2021  Encounter Provider:  VIET Gant  Place of Service:  [facility]  Patient Name: Jacqueline Mcdowell  :  1970    As part of the Annual Wellness portion of your visit today, we are providing you with this personalized preventive plan of services (PPPS). This plan is based upon recommendations of the [place] Preventive Services Task Force (USPSTF) and the Advisory Committee on Immunization Practices (ACIP).    This lists the preventive care services that should be considered, and provides dates of when you are due. Items listed as completed are up-to-date and do not require any further intervention.    Health Maintenance   Topic Date Due   • TDAP/TD VACCINES (1 - Tdap) Never done   • HEPATITIS C SCREENING  Never done   • PAP SMEAR  2017   • LIPID PANEL  2020   • COVID-19 Vaccine (2 - Pfizer 2-dose series) 2021   • INFLUENZA VACCINE  2021   • ANNUAL PHYSICAL  07/10/2022   • COLORECTAL CANCER SCREENING  2031   • Pneumococcal Vaccine 0-64  Aged Out       Orders Placed This Encounter   Procedures   • Comprehensive Metabolic Panel     Order Specific Question:   Release to patient     Answer:   Immediate   • Lipid Panel With / Chol / HDL Ratio     Order Specific Question:   Release to patient     Answer:   Immediate   • Ambulatory Referral to ENT (Otolaryngology)     Referral Priority:   Routine     Referral Type:   Consultation     Referral Reason:   Specialty Services Required     Referred to Provider:   Jomar Penaloza MD     Requested Specialty:   Otolaryngology     Number of Visits Requested:   1   • CBC & Differential     Order Specific Question:   Manual Differential     Answer:   No       Return in about 6 months (around 2022) for Recheck BP, Labs.

## 2021-07-09 NOTE — PATIENT INSTRUCTIONS
Return in about 6 months (around 2022) for Recheck BP, Labs.  Call with any questions or concerns.  If you develop chest pain, shortness of breath, increased dizziness, palpitations or a headache that will not resolve, go to ER.   May use over the counter antihistamine.   Annual Wellness  Personal Prevention Plan of Service     Date of Office Visit:  2021  Encounter Provider:  VIET Gant  Place of Service:  [facility]  Patient Name: Jacqueline Mcdowell  :  1970    As part of the Annual Wellness portion of your visit today, we are providing you with this personalized preventive plan of services (PPPS). This plan is based upon recommendations of the [place] Preventive Services Task Force (USPSTF) and the Advisory Committee on Immunization Practices (ACIP).    This lists the preventive care services that should be considered, and provides dates of when you are due. Items listed as completed are up-to-date and do not require any further intervention.    Health Maintenance   Topic Date Due   • TDAP/TD VACCINES (1 - Tdap) Never done   • HEPATITIS C SCREENING  Never done   • PAP SMEAR  2017   • LIPID PANEL  2020   • COVID-19 Vaccine (2 - Pfizer 2-dose series) 2021   • INFLUENZA VACCINE  2021   • ANNUAL PHYSICAL  07/10/2022   • COLORECTAL CANCER SCREENING  2031   • Pneumococcal Vaccine 0-64  Aged Out       Orders Placed This Encounter   Procedures   • Comprehensive Metabolic Panel     Order Specific Question:   Release to patient     Answer:   Immediate   • Lipid Panel With / Chol / HDL Ratio     Order Specific Question:   Release to patient     Answer:   Immediate   • Ambulatory Referral to ENT (Otolaryngology)     Referral Priority:   Routine     Referral Type:   Consultation     Referral Reason:   Specialty Services Required     Referred to Provider:   Jomar Penaloza MD     Requested Specialty:   Otolaryngology     Number  of Visits Requested:   1   • CBC & Differential     Order Specific Question:   Manual Differential     Answer:   No       Return in about 6 months (around 1/9/2022) for Recheck BP, Labs.

## 2021-07-10 LAB
ALBUMIN SERPL-MCNC: 3.9 G/DL (ref 3.8–4.8)
ALBUMIN/GLOB SERPL: 1 {RATIO} (ref 1.2–2.2)
ALP SERPL-CCNC: 90 IU/L (ref 48–121)
ALT SERPL-CCNC: 19 IU/L (ref 0–32)
AST SERPL-CCNC: 20 IU/L (ref 0–40)
BASOPHILS # BLD AUTO: 0.1 X10E3/UL (ref 0–0.2)
BASOPHILS NFR BLD AUTO: 1 %
BILIRUB SERPL-MCNC: <0.2 MG/DL (ref 0–1.2)
BUN SERPL-MCNC: 10 MG/DL (ref 6–24)
BUN/CREAT SERPL: 13 (ref 9–23)
CALCIUM SERPL-MCNC: 9.9 MG/DL (ref 8.7–10.2)
CHLORIDE SERPL-SCNC: 98 MMOL/L (ref 96–106)
CHOLEST SERPL-MCNC: 295 MG/DL (ref 100–199)
CHOLEST/HDLC SERPL: 7.2 RATIO (ref 0–4.4)
CO2 SERPL-SCNC: 24 MMOL/L (ref 20–29)
CREAT SERPL-MCNC: 0.76 MG/DL (ref 0.57–1)
EOSINOPHIL # BLD AUTO: 0.3 X10E3/UL (ref 0–0.4)
EOSINOPHIL NFR BLD AUTO: 3 %
ERYTHROCYTE [DISTWIDTH] IN BLOOD BY AUTOMATED COUNT: 14.7 % (ref 11.7–15.4)
GLOBULIN SER CALC-MCNC: 4.1 G/DL (ref 1.5–4.5)
GLUCOSE SERPL-MCNC: 94 MG/DL (ref 65–99)
HCT VFR BLD AUTO: 36.5 % (ref 34–46.6)
HDLC SERPL-MCNC: 41 MG/DL
HGB BLD-MCNC: 11.7 G/DL (ref 11.1–15.9)
IMM GRANULOCYTES # BLD AUTO: 0 X10E3/UL (ref 0–0.1)
IMM GRANULOCYTES NFR BLD AUTO: 0 %
LDLC SERPL CALC-MCNC: 200 MG/DL (ref 0–99)
LYMPHOCYTES # BLD AUTO: 2.9 X10E3/UL (ref 0.7–3.1)
LYMPHOCYTES NFR BLD AUTO: 32 %
MCH RBC QN AUTO: 26.7 PG (ref 26.6–33)
MCHC RBC AUTO-ENTMCNC: 32.1 G/DL (ref 31.5–35.7)
MCV RBC AUTO: 83 FL (ref 79–97)
MONOCYTES # BLD AUTO: 1 X10E3/UL (ref 0.1–0.9)
MONOCYTES NFR BLD AUTO: 11 %
MORPHOLOGY BLD-IMP: ABNORMAL
NEUTROPHILS # BLD AUTO: 4.8 X10E3/UL (ref 1.4–7)
NEUTROPHILS NFR BLD AUTO: 53 %
PLATELET # BLD AUTO: 426 X10E3/UL (ref 150–450)
POTASSIUM SERPL-SCNC: 4.2 MMOL/L (ref 3.5–5.2)
PROT SERPL-MCNC: 8 G/DL (ref 6–8.5)
RBC # BLD AUTO: 4.38 X10E6/UL (ref 3.77–5.28)
SODIUM SERPL-SCNC: 137 MMOL/L (ref 134–144)
TRIGL SERPL-MCNC: 270 MG/DL (ref 0–149)
VLDLC SERPL CALC-MCNC: 54 MG/DL (ref 5–40)
WBC # BLD AUTO: 9.1 X10E3/UL (ref 3.4–10.8)

## 2021-07-11 DIAGNOSIS — E78.2 MIXED HYPERLIPIDEMIA: Primary | ICD-10-CM

## 2021-07-11 RX ORDER — ROSUVASTATIN CALCIUM 10 MG/1
10 TABLET, COATED ORAL DAILY
Qty: 90 TABLET | Refills: 1 | Status: SHIPPED | OUTPATIENT
Start: 2021-07-11 | End: 2021-07-23 | Stop reason: SDUPTHER

## 2021-07-15 DIAGNOSIS — I10 ESSENTIAL HYPERTENSION: ICD-10-CM

## 2021-07-15 DIAGNOSIS — Z00.00 ANNUAL PHYSICAL EXAM: ICD-10-CM

## 2021-07-15 RX ORDER — OLMESARTAN MEDOXOMIL AND HYDROCHLOROTHIAZIDE 40/12.5 40; 12.5 MG/1; MG/1
1 TABLET ORAL DAILY
Qty: 90 TABLET | Refills: 1 | Status: SHIPPED | OUTPATIENT
Start: 2021-07-15 | End: 2021-12-08

## 2021-07-15 NOTE — TELEPHONE ENCOUNTER
Caller:  COLE UNDERWOOD    Relationship: PATIENT    Best call back number: [phone]     Medication needed:   Requested Prescriptions     Pending Prescriptions Disp Refills   • olmesartan-hydrochlorothiazide (BENICAR HCT) 40-12.5 MG per tablet 90 tablet 1     Sig: Take 1 tablet by mouth Daily.       When do you need the refill by: 07/15/21    What additional details did the patient provide when requesting the medication: SHE STATES THAT [facility] IS WAITING TO GET REFILL REQUEST AND AUTH FROM [facility] FOR THIS PRESCRIPTION.      Does the patient have less than a 3 day supply:  [] Yes  [x] No    What is the patient's preferred pharmacy: [facility] - [facility] [address] - [phone] [facility] [phone]

## 2021-07-16 RX ORDER — CLOBETASOL PROPIONATE 0.5 MG/G
1 CREAM TOPICAL DAILY
COMMUNITY
Start: 2021-07-15

## 2021-07-16 RX ORDER — VALACYCLOVIR HYDROCHLORIDE 500 MG/1
500 TABLET, FILM COATED ORAL 2 TIMES DAILY
Qty: 60 TABLET | Refills: 2 | Status: SHIPPED | OUTPATIENT
Start: 2021-07-16 | End: 2021-08-11

## 2021-07-23 ENCOUNTER — TELEPHONE (OUTPATIENT)
Dept: FAMILY MEDICINE CLINIC | Facility: CLINIC | Age: 51
End: 2021-07-23

## 2021-07-23 DIAGNOSIS — E78.2 MIXED HYPERLIPIDEMIA: ICD-10-CM

## 2021-07-23 DIAGNOSIS — I10 ESSENTIAL HYPERTENSION: ICD-10-CM

## 2021-07-23 RX ORDER — AMLODIPINE BESYLATE 5 MG/1
5 TABLET ORAL DAILY
Qty: 30 TABLET | Refills: 5 | Status: SHIPPED | OUTPATIENT
Start: 2021-07-23 | End: 2021-07-30 | Stop reason: SDUPTHER

## 2021-07-23 RX ORDER — ROSUVASTATIN CALCIUM 10 MG/1
10 TABLET, COATED ORAL DAILY
Qty: 90 TABLET | Refills: 1 | Status: SHIPPED | OUTPATIENT
Start: 2021-07-23 | End: 2021-07-30 | Stop reason: SDUPTHER

## 2021-07-30 DIAGNOSIS — I10 ESSENTIAL HYPERTENSION: ICD-10-CM

## 2021-07-30 DIAGNOSIS — E78.2 MIXED HYPERLIPIDEMIA: ICD-10-CM

## 2021-07-30 RX ORDER — ROSUVASTATIN CALCIUM 10 MG/1
10 TABLET, COATED ORAL DAILY
Qty: 90 TABLET | Refills: 1 | Status: SHIPPED | OUTPATIENT
Start: 2021-07-30 | End: 2022-02-14

## 2021-07-30 RX ORDER — AMLODIPINE BESYLATE 5 MG/1
5 TABLET ORAL DAILY
Qty: 90 TABLET | Refills: 1 | Status: SHIPPED | OUTPATIENT
Start: 2021-07-30 | End: 2021-12-23

## 2021-07-30 NOTE — TELEPHONE ENCOUNTER
[facility] CALLING IN TO CHECK ON THESE REFILLS. LOOKS LIKE THEY WENT TO [facility] AND THEY ARE NOT SURE IF SHE DID PICK THEM UP FROM THERE.      PLEASE ADVISE    TRYING TO SEE IF THEY NEED TO BE RESENT TO [facility].

## 2021-08-11 RX ORDER — VALACYCLOVIR HYDROCHLORIDE 500 MG/1
TABLET, FILM COATED ORAL
Qty: 90 TABLET | Refills: 0 | Status: SHIPPED | OUTPATIENT
Start: 2021-08-11

## 2021-08-11 RX ORDER — VALACYCLOVIR HYDROCHLORIDE 500 MG/1
TABLET, FILM COATED ORAL
Qty: 90 TABLET | Status: CANCELLED | OUTPATIENT
Start: 2021-08-11

## 2021-08-13 DIAGNOSIS — Z12.31 ENCOUNTER FOR SCREENING MAMMOGRAM FOR MALIGNANT NEOPLASM OF BREAST: Primary | ICD-10-CM

## 2021-08-16 ENCOUNTER — HOSPITAL ENCOUNTER (OUTPATIENT)
Dept: ULTRASOUND IMAGING | Facility: HOSPITAL | Age: 51
Discharge: HOME | End: 2021-08-16

## 2021-08-16 ENCOUNTER — HOSPITAL ENCOUNTER (OUTPATIENT)
Dept: MAMMOGRAPHY | Facility: HOSPITAL | Age: 51
Discharge: HOME | End: 2021-08-16

## 2021-08-16 DIAGNOSIS — Z12.31 ENCOUNTER FOR SCREENING MAMMOGRAM FOR MALIGNANT NEOPLASM OF BREAST: ICD-10-CM

## 2021-08-16 DIAGNOSIS — N63.10 BREAST MASS, RIGHT: ICD-10-CM

## 2021-08-16 DIAGNOSIS — N63.10 BREAST MASS, RIGHT: Primary | ICD-10-CM

## 2021-08-16 PROCEDURE — 77066 DX MAMMO INCL CAD BI: CPT

## 2021-08-16 PROCEDURE — 76642 ULTRASOUND BREAST LIMITED: CPT

## 2021-08-16 PROCEDURE — 77067 SCR MAMMO BI INCL CAD: CPT

## 2021-08-16 PROCEDURE — 77063 BREAST TOMOSYNTHESIS BI: CPT

## 2021-08-16 PROCEDURE — G0279 TOMOSYNTHESIS, MAMMO: HCPCS

## 2021-08-27 ENCOUNTER — OFFICE VISIT (OUTPATIENT)
Dept: FAMILY MEDICINE CLINIC | Facility: CLINIC | Age: 51
End: 2021-08-27

## 2021-08-27 VITALS
OXYGEN SATURATION: 99 % | HEART RATE: 89 BPM | BODY MASS INDEX: 33.92 KG/M2 | HEIGHT: 69 IN | SYSTOLIC BLOOD PRESSURE: 136 MMHG | DIASTOLIC BLOOD PRESSURE: 86 MMHG | WEIGHT: 229 LBS | RESPIRATION RATE: 16 BRPM

## 2021-08-27 DIAGNOSIS — R05.9 COUGH: ICD-10-CM

## 2021-08-27 DIAGNOSIS — J01.10 ACUTE NON-RECURRENT FRONTAL SINUSITIS: Primary | ICD-10-CM

## 2021-08-27 PROCEDURE — 99213 OFFICE O/P EST LOW 20 MIN: CPT | Performed by: NURSE PRACTITIONER

## 2021-08-27 RX ORDER — AMOXICILLIN AND CLAVULANATE POTASSIUM 875; 125 MG/1; MG/1
1 TABLET, FILM COATED ORAL 2 TIMES DAILY
Qty: 14 TABLET | Refills: 0 | Status: SHIPPED | OUTPATIENT
Start: 2021-08-27 | End: 2021-09-03

## 2021-08-27 RX ORDER — MAGNESIUM OXIDE 400 MG/1
TABLET ORAL
COMMUNITY
Start: 2021-07-27

## 2021-08-27 RX ORDER — DEXTROMETHORPHAN HYDROBROMIDE AND PROMETHAZINE HYDROCHLORIDE 15; 6.25 MG/5ML; MG/5ML
5 SYRUP ORAL 4 TIMES DAILY PRN
Qty: 180 ML | Refills: 0 | Status: SHIPPED | OUTPATIENT
Start: 2021-08-27

## 2021-08-27 NOTE — PATIENT INSTRUCTIONS
I am prescribing you an antibiotic, it is important that you take all of this medication even if you are feeling better.  Return if symptoms worsen or fail to improve.  May use mucinex over the counter  May use saline nose spray     Sinusitis, Adult  Sinusitis is soreness and swelling (inflammation) of your sinuses. Sinuses are hollow spaces in the bones around your face. They are located:  · Around your eyes.  · In the middle of your forehead.  · Behind your nose.  · In your cheekbones.  Your sinuses and nasal passages are lined with a fluid called mucus. Mucus drains out of your sinuses. Swelling can trap mucus in your sinuses. This lets germs (bacteria, virus, or fungus) grow, which leads to infection. Most of the time, this condition is caused by a virus.  What are the causes?  This condition is caused by:  · Allergies.  · Asthma.  · Germs.  · Things that block your nose or sinuses.  · Growths in the nose (nasal polyps).  · Chemicals or irritants in the air.  · Fungus (rare).  What increases the risk?  You are more likely to develop this condition if:  · You have a weak body defense system (immune system).  · You do a lot of swimming or diving.  · You use nasal sprays too much.  · You smoke.  What are the signs or symptoms?  The main symptoms of this condition are pain and a feeling of pressure around the sinuses. Other symptoms include:  · Stuffy nose (congestion).  · Runny nose (drainage).  · Swelling and warmth in the sinuses.  · Headache.  · Toothache.  · A cough that may get worse at night.  · Mucus that collects in the throat or the back of the nose (postnasal drip).  · Being unable to smell and taste.  · Being very tired (fatigue).  · A fever.  · Sore throat.  · Bad breath.  How is this diagnosed?  This condition is diagnosed based on:  · Your symptoms.  · Your medical history.  · A physical exam.  · Tests to find out if your condition is short-term (acute) or long-term (chronic). Your doctor  may:  ? Check your nose for growths (polyps).  ? Check your sinuses using a tool that has a light (endoscope).  ? Check for allergies or germs.  ? Do imaging tests, such as an MRI or CT scan.  How is this treated?  Treatment for this condition depends on the cause and whether it is short-term or long-term.  · If caused by a virus, your symptoms should go away on their own within 10 days. You may be given medicines to relieve symptoms. They include:  ? Medicines that shrink swollen tissue in the nose.  ? Medicines that treat allergies (antihistamines).  ? A spray that treats swelling of the nostrils.   ? Rinses that help get rid of thick mucus in your nose (nasal saline washes).  · If caused by bacteria, your doctor may wait to see if you will get better without treatment. You may be given antibiotic medicine if you have:  ? A very bad infection.  ? A weak body defense system.  · If caused by growths in the nose, you may need to have surgery.  Follow these instructions at home:  Medicines  · Take, use, or apply over-the-counter and prescription medicines only as told by your doctor. These may include nasal sprays.  · If you were prescribed an antibiotic medicine, take it as told by your doctor. Do not stop taking the antibiotic even if you start to feel better.  Hydrate and humidify    · Drink enough water to keep your pee (urine) pale yellow.  · Use a cool mist humidifier to keep the humidity level in your home above 50%.  · Breathe in steam for 10-15 minutes, 3-4 times a day, or as told by your doctor. You can do this in the bathroom while a hot shower is running.  · Try not to spend time in cool or dry air.  Rest  · Rest as much as you can.  · Sleep with your head raised (elevated).  · Make sure you get enough sleep each night.  General instructions    · Put a warm, moist washcloth on your face 3-4 times a day, or as often as told by your doctor. This will help with discomfort.  · Wash your hands often with soap  and water. If there is no soap and water, use hand .  · Do not smoke. Avoid being around people who are smoking (secondhand smoke).  · Keep all follow-up visits as told by your doctor. This is important.  Contact a doctor if:  · You have a fever.  · Your symptoms get worse.  · Your symptoms do not get better within 10 days.  Get help right away if:  · You have a very bad headache.  · You cannot stop throwing up (vomiting).  · You have very bad pain or swelling around your face or eyes.  · You have trouble seeing.  · You feel confused.  · Your neck is stiff.  · You have trouble breathing.  Summary  · Sinusitis is swelling of your sinuses. Sinuses are hollow spaces in the bones around your face.  · This condition is caused by tissues in your nose that become inflamed or swollen. This traps germs. These can lead to infection.  · If you were prescribed an antibiotic medicine, take it as told by your doctor. Do not stop taking it even if you start to feel better.  · Keep all follow-up visits as told by your doctor. This is important.  This information is not intended to replace advice given to you by your health care provider. Make sure you discuss any questions you have with your health care provider.  Document Revised: 05/20/2019 Document Reviewed: 05/20/2019  [employer] Patient Education © 2021 [employer]

## 2021-08-27 NOTE — PROGRESS NOTES
Subjective   Jacqueline Mcdowell is a 50 y.o. female.     History of Present Illness   Patient presents with nasal congestion, headache and productive cough. She reports that she had a Covid test at [facility]. She denies any fever or shortness of breath. Patient has taken tylenol sinus and headache.     The following portions of the patient's history were reviewed and updated as appropriate: allergies, current medications, past family history, past medical history, past social history, past surgical history and problem list.    Review of Systems   Constitutional: Negative for appetite change, chills, fatigue and fever.   HENT: Positive for congestion, postnasal drip, rhinorrhea, sinus pressure and sore throat. Negative for dental problem, ear pain and sneezing.    Eyes: Negative for redness and itching.   Respiratory: Positive for cough. Negative for chest tightness, shortness of breath and wheezing.    Cardiovascular: Negative for chest pain, palpitations and leg swelling.   Musculoskeletal: Negative for myalgias.   Allergic/Immunologic: Negative.    Neurological: Positive for headache. Negative for dizziness.       Objective   Physical Exam  Vitals and nursing note reviewed.   Constitutional:       Appearance: She is well-developed.   HENT:      Head: Normocephalic and atraumatic.      Right Ear: Tympanic membrane, ear canal and external ear normal.      Left Ear: Tympanic membrane, ear canal and external ear normal.      Nose:      Right Sinus: Frontal sinus tenderness present. No maxillary sinus tenderness.      Left Sinus: Frontal sinus tenderness present. No maxillary sinus tenderness.      Mouth/Throat:      Lips: Pink.      Mouth: Mucous membranes are moist.      Pharynx: Oropharyngeal exudate present.      Tonsils: No tonsillar exudate.   Eyes:      General:         Right eye: No discharge.         Left eye: No discharge.      Conjunctiva/sclera: Conjunctivae normal.      Pupils: Pupils are equal,  round, and reactive to light.   Neck:      Thyroid: No thyromegaly.   Cardiovascular:      Rate and Rhythm: Normal rate and regular rhythm.      Heart sounds: Normal heart sounds. No murmur heard.     Pulmonary:      Effort: Pulmonary effort is normal. No tachypnea or respiratory distress.      Breath sounds: Normal breath sounds. No decreased breath sounds, wheezing or rales.   Musculoskeletal:      Cervical back: Normal range of motion and neck supple.   Lymphadenopathy:      Cervical: No cervical adenopathy.   Skin:     General: Skin is warm and dry.   Neurological:      Mental Status: She is alert and oriented to person, place, and time.   Psychiatric:         Behavior: Behavior normal.         Thought Content: Thought content normal.         Judgment: Judgment normal.         Vitals:    08/27/21 1414   BP: 136/86   Pulse: 89   Resp: 16   SpO2: 99%     Body mass index is 33.92 kg/m².    Procedures    Assessment/Plan   Problems Addressed this Visit     None      Visit Diagnoses     Acute non-recurrent frontal sinusitis    -  Primary    Relevant Medications    amoxicillin-clavulanate (Augmentin) 875-125 MG per tablet    Cough        Relevant Medications    promethazine-dextromethorphan (PROMETHAZINE-DM) 6.25-15 MG/5ML syrup      Diagnoses       Codes Comments    Acute non-recurrent frontal sinusitis    -  Primary ICD-10-CM: J01.10  ICD-9-CM: 461.1     Cough     ICD-10-CM: R05  ICD-9-CM: 786.2         May use mucinex over the counter  May use saline nose spray          Return if symptoms worsen or fail to improve.

## 2021-09-13 DIAGNOSIS — R07.9 CHEST PAIN, UNSPECIFIED TYPE: Primary | ICD-10-CM

## 2021-09-14 DIAGNOSIS — R91.8 ABNORMAL CT LUNG SCREENING: Primary | ICD-10-CM

## 2021-12-08 DIAGNOSIS — I10 ESSENTIAL HYPERTENSION: ICD-10-CM

## 2021-12-08 DIAGNOSIS — Z00.00 ANNUAL PHYSICAL EXAM: ICD-10-CM

## 2021-12-08 RX ORDER — OLMESARTAN MEDOXOMIL AND HYDROCHLOROTHIAZIDE 40/12.5 40; 12.5 MG/1; MG/1
TABLET ORAL
Qty: 90 TABLET | Refills: 3 | Status: SHIPPED | OUTPATIENT
Start: 2021-12-08 | End: 2022-09-28

## 2021-12-23 DIAGNOSIS — I10 ESSENTIAL HYPERTENSION: ICD-10-CM

## 2021-12-23 RX ORDER — AMLODIPINE BESYLATE 5 MG/1
TABLET ORAL
Qty: 90 TABLET | Refills: 1 | Status: SHIPPED | OUTPATIENT
Start: 2021-12-23 | End: 2022-05-23

## 2022-02-12 DIAGNOSIS — E78.2 MIXED HYPERLIPIDEMIA: ICD-10-CM

## 2022-02-14 RX ORDER — ROSUVASTATIN CALCIUM 10 MG/1
TABLET, COATED ORAL
Qty: 90 TABLET | Refills: 1 | Status: SHIPPED | OUTPATIENT
Start: 2022-02-14 | End: 2022-07-11

## 2022-05-23 DIAGNOSIS — I10 ESSENTIAL HYPERTENSION: ICD-10-CM

## 2022-05-23 RX ORDER — AMLODIPINE BESYLATE 5 MG/1
TABLET ORAL
Qty: 30 TABLET | Refills: 0 | Status: SHIPPED | OUTPATIENT
Start: 2022-05-23 | End: 2022-07-15

## 2022-07-11 DIAGNOSIS — E78.2 MIXED HYPERLIPIDEMIA: ICD-10-CM

## 2022-07-11 RX ORDER — ROSUVASTATIN CALCIUM 10 MG/1
TABLET, COATED ORAL
Qty: 30 TABLET | Refills: 0 | Status: SHIPPED | OUTPATIENT
Start: 2022-07-11 | End: 2022-09-02

## 2022-07-11 NOTE — TELEPHONE ENCOUNTER
Last office visit: 8/27/22    Next office visit: Not scheduled (overdue since 1/2022)    Last refill: 2/14/22

## 2022-07-15 DIAGNOSIS — I10 ESSENTIAL HYPERTENSION: ICD-10-CM

## 2022-07-15 RX ORDER — AMLODIPINE BESYLATE 5 MG/1
TABLET ORAL
Qty: 30 TABLET | Refills: 0 | Status: SHIPPED | OUTPATIENT
Start: 2022-07-15 | End: 2022-09-08

## 2022-09-02 DIAGNOSIS — E78.2 MIXED HYPERLIPIDEMIA: ICD-10-CM

## 2022-09-02 RX ORDER — ROSUVASTATIN CALCIUM 10 MG/1
10 TABLET, COATED ORAL DAILY
Qty: 90 TABLET | Refills: 1 | Status: SHIPPED | OUTPATIENT
Start: 2022-09-02 | End: 2022-11-30

## 2022-09-02 RX ORDER — ROSUVASTATIN CALCIUM 10 MG/1
TABLET, COATED ORAL
Qty: 15 TABLET | Refills: 0 | Status: SHIPPED | OUTPATIENT
Start: 2022-09-02 | End: 2022-09-02 | Stop reason: SDUPTHER

## 2022-09-08 DIAGNOSIS — I10 ESSENTIAL HYPERTENSION: ICD-10-CM

## 2022-09-08 RX ORDER — AMLODIPINE BESYLATE 5 MG/1
TABLET ORAL
Qty: 30 TABLET | Refills: 0 | Status: SHIPPED | OUTPATIENT
Start: 2022-09-08 | End: 2022-11-01

## 2022-09-08 NOTE — TELEPHONE ENCOUNTER
Last OV: 8/27/2021    Next OV: not scheduled  (overdue for annual since 7/10/2022)    Last Refill: 7/15/2022

## 2022-09-28 DIAGNOSIS — I10 ESSENTIAL HYPERTENSION: ICD-10-CM

## 2022-09-28 DIAGNOSIS — Z00.00 ANNUAL PHYSICAL EXAM: ICD-10-CM

## 2022-09-28 RX ORDER — OLMESARTAN MEDOXOMIL AND HYDROCHLOROTHIAZIDE 40/12.5 40; 12.5 MG/1; MG/1
1 TABLET ORAL DAILY
Qty: 30 TABLET | Refills: 0 | Status: SHIPPED | OUTPATIENT
Start: 2022-09-28

## 2022-10-21 ENCOUNTER — APPOINTMENT (OUTPATIENT)
Dept: WOMENS IMAGING | Facility: HOSPITAL | Age: 52
End: 2022-10-21

## 2022-10-21 PROCEDURE — G0279 TOMOSYNTHESIS, MAMMO: HCPCS | Performed by: RADIOLOGY

## 2022-10-21 PROCEDURE — 76642 ULTRASOUND BREAST LIMITED: CPT | Performed by: RADIOLOGY

## 2022-10-21 PROCEDURE — 77066 DX MAMMO INCL CAD BI: CPT | Performed by: RADIOLOGY

## 2022-10-21 PROCEDURE — 77062 BREAST TOMOSYNTHESIS BI: CPT | Performed by: RADIOLOGY

## 2022-10-31 DIAGNOSIS — I10 ESSENTIAL HYPERTENSION: ICD-10-CM

## 2022-11-01 RX ORDER — AMLODIPINE BESYLATE 5 MG/1
TABLET ORAL
Qty: 15 TABLET | Refills: 0 | Status: SHIPPED | OUTPATIENT
Start: 2022-11-01 | End: 2022-11-23

## 2022-11-23 DIAGNOSIS — I10 ESSENTIAL HYPERTENSION: ICD-10-CM

## 2022-11-23 RX ORDER — AMLODIPINE BESYLATE 5 MG/1
TABLET ORAL
Qty: 15 TABLET | Refills: 0 | Status: SHIPPED | OUTPATIENT
Start: 2022-11-23 | End: 2022-12-19

## 2022-11-30 DIAGNOSIS — E78.2 MIXED HYPERLIPIDEMIA: ICD-10-CM

## 2022-11-30 RX ORDER — ROSUVASTATIN CALCIUM 10 MG/1
TABLET, COATED ORAL
Qty: 15 TABLET | Refills: 0 | Status: SHIPPED | OUTPATIENT
Start: 2022-11-30

## 2022-12-17 DIAGNOSIS — I10 ESSENTIAL HYPERTENSION: ICD-10-CM

## 2022-12-19 RX ORDER — AMLODIPINE BESYLATE 5 MG/1
TABLET ORAL
Qty: 10 TABLET | Refills: 0 | Status: SHIPPED | OUTPATIENT
Start: 2022-12-19

## 2022-12-24 DIAGNOSIS — E78.2 MIXED HYPERLIPIDEMIA: ICD-10-CM

## 2022-12-24 RX ORDER — ROSUVASTATIN CALCIUM 10 MG/1
TABLET, COATED ORAL
Qty: 30 TABLET | OUTPATIENT
Start: 2022-12-24

## 2023-06-14 DIAGNOSIS — I10 ESSENTIAL HYPERTENSION: ICD-10-CM

## 2023-06-14 DIAGNOSIS — E78.2 MIXED HYPERLIPIDEMIA: ICD-10-CM

## 2023-06-14 RX ORDER — AMLODIPINE BESYLATE 5 MG/1
TABLET ORAL
Qty: 20 TABLET | OUTPATIENT
Start: 2023-06-14

## 2023-06-14 RX ORDER — ROSUVASTATIN CALCIUM 10 MG/1
TABLET, COATED ORAL
Qty: 30 TABLET | OUTPATIENT
Start: 2023-06-14

## 2023-08-03 DIAGNOSIS — I10 ESSENTIAL HYPERTENSION: ICD-10-CM

## 2023-08-03 DIAGNOSIS — Z00.00 ANNUAL PHYSICAL EXAM: ICD-10-CM

## 2023-08-03 DIAGNOSIS — E78.2 MIXED HYPERLIPIDEMIA: ICD-10-CM

## 2023-08-03 RX ORDER — ROSUVASTATIN CALCIUM 10 MG/1
10 TABLET, COATED ORAL DAILY
Qty: 15 TABLET | Refills: 0 | Status: SHIPPED | OUTPATIENT
Start: 2023-08-03 | End: 2023-08-07 | Stop reason: SDUPTHER

## 2023-08-03 RX ORDER — AMLODIPINE BESYLATE 5 MG/1
5 TABLET ORAL DAILY
Qty: 10 TABLET | Refills: 0 | Status: SHIPPED | OUTPATIENT
Start: 2023-08-03 | End: 2023-08-07 | Stop reason: SDUPTHER

## 2023-08-03 RX ORDER — OLMESARTAN MEDOXOMIL AND HYDROCHLOROTHIAZIDE 40/12.5 40; 12.5 MG/1; MG/1
1 TABLET ORAL DAILY
Qty: 30 TABLET | Refills: 0 | Status: SHIPPED | OUTPATIENT
Start: 2023-08-03 | End: 2023-08-07 | Stop reason: SDUPTHER

## 2023-08-07 DIAGNOSIS — Z00.00 ANNUAL PHYSICAL EXAM: ICD-10-CM

## 2023-08-07 DIAGNOSIS — I10 ESSENTIAL HYPERTENSION: ICD-10-CM

## 2023-08-07 DIAGNOSIS — E78.2 MIXED HYPERLIPIDEMIA: ICD-10-CM

## 2023-08-07 RX ORDER — AMLODIPINE BESYLATE 5 MG/1
5 TABLET ORAL DAILY
Qty: 10 TABLET | Refills: 0 | Status: SHIPPED | OUTPATIENT
Start: 2023-08-07

## 2023-08-07 RX ORDER — ROSUVASTATIN CALCIUM 10 MG/1
10 TABLET, COATED ORAL DAILY
Qty: 15 TABLET | Refills: 0 | Status: SHIPPED | OUTPATIENT
Start: 2023-08-07

## 2023-08-07 RX ORDER — OLMESARTAN MEDOXOMIL AND HYDROCHLOROTHIAZIDE 40/12.5 40; 12.5 MG/1; MG/1
1 TABLET ORAL DAILY
Qty: 30 TABLET | Refills: 0 | Status: SHIPPED | OUTPATIENT
Start: 2023-08-07

## 2023-08-18 ENCOUNTER — TELEPHONE (OUTPATIENT)
Dept: FAMILY MEDICINE CLINIC | Facility: CLINIC | Age: 53
End: 2023-08-18
Payer: COMMERCIAL

## 2023-08-18 DIAGNOSIS — E78.2 MIXED HYPERLIPIDEMIA: ICD-10-CM

## 2023-08-18 RX ORDER — ROSUVASTATIN CALCIUM 10 MG/1
10 TABLET, COATED ORAL DAILY
Qty: 90 TABLET | Refills: 0 | Status: SHIPPED | OUTPATIENT
Start: 2023-08-18

## 2023-08-29 DIAGNOSIS — I10 ESSENTIAL HYPERTENSION: ICD-10-CM

## 2023-08-29 DIAGNOSIS — Z00.00 ANNUAL PHYSICAL EXAM: ICD-10-CM

## 2023-08-29 RX ORDER — OLMESARTAN MEDOXOMIL AND HYDROCHLOROTHIAZIDE 40/12.5 40; 12.5 MG/1; MG/1
TABLET ORAL
Qty: 30 TABLET | Refills: 3 | Status: SHIPPED | OUTPATIENT
Start: 2023-08-29

## 2023-10-25 DIAGNOSIS — E78.2 MIXED HYPERLIPIDEMIA: ICD-10-CM

## 2023-10-25 RX ORDER — ROSUVASTATIN CALCIUM 10 MG/1
10 TABLET, COATED ORAL DAILY
Qty: 90 TABLET | Refills: 0 | Status: SHIPPED | OUTPATIENT
Start: 2023-10-25

## 2023-11-21 ENCOUNTER — APPOINTMENT (OUTPATIENT)
Dept: WOMENS IMAGING | Facility: HOSPITAL | Age: 53
End: 2023-11-21
Payer: COMMERCIAL

## 2023-11-21 PROCEDURE — 77063 BREAST TOMOSYNTHESIS BI: CPT | Performed by: RADIOLOGY

## 2023-11-21 PROCEDURE — 77067 SCR MAMMO BI INCL CAD: CPT | Performed by: RADIOLOGY

## 2023-12-06 DIAGNOSIS — I10 ESSENTIAL HYPERTENSION: ICD-10-CM

## 2023-12-06 RX ORDER — AMLODIPINE BESYLATE 5 MG/1
5 TABLET ORAL DAILY
Qty: 90 TABLET | Refills: 0 | Status: SHIPPED | OUTPATIENT
Start: 2023-12-06

## 2023-12-12 DIAGNOSIS — Z00.00 ANNUAL PHYSICAL EXAM: ICD-10-CM

## 2023-12-12 DIAGNOSIS — I10 ESSENTIAL HYPERTENSION: ICD-10-CM

## 2023-12-13 RX ORDER — OLMESARTAN MEDOXOMIL AND HYDROCHLOROTHIAZIDE 40/12.5 40; 12.5 MG/1; MG/1
1 TABLET ORAL DAILY
Qty: 30 TABLET | Refills: 0 | Status: SHIPPED | OUTPATIENT
Start: 2023-12-13

## 2024-01-17 DIAGNOSIS — Z00.00 ANNUAL PHYSICAL EXAM: ICD-10-CM

## 2024-01-17 DIAGNOSIS — I10 ESSENTIAL HYPERTENSION: ICD-10-CM

## 2024-01-17 RX ORDER — OLMESARTAN MEDOXOMIL AND HYDROCHLOROTHIAZIDE 40/12.5 40; 12.5 MG/1; MG/1
1 TABLET ORAL DAILY
Qty: 30 TABLET | Refills: 11 | Status: SHIPPED | OUTPATIENT
Start: 2024-01-17

## 2024-01-31 DIAGNOSIS — I10 ESSENTIAL HYPERTENSION: ICD-10-CM

## 2024-01-31 RX ORDER — AMLODIPINE BESYLATE 5 MG/1
5 TABLET ORAL DAILY
Qty: 10 TABLET | Refills: 0 | Status: SHIPPED | OUTPATIENT
Start: 2024-01-31

## 2024-02-11 DIAGNOSIS — E78.2 MIXED HYPERLIPIDEMIA: ICD-10-CM

## 2024-02-12 RX ORDER — ROSUVASTATIN CALCIUM 10 MG/1
10 TABLET, COATED ORAL DAILY
Qty: 30 TABLET | Refills: 0 | Status: SHIPPED | OUTPATIENT
Start: 2024-02-12

## 2024-02-27 ENCOUNTER — OFFICE VISIT (OUTPATIENT)
Dept: FAMILY MEDICINE CLINIC | Facility: CLINIC | Age: 54
End: 2024-02-27
Payer: COMMERCIAL

## 2024-02-27 VITALS
HEIGHT: 69 IN | BODY MASS INDEX: 34.36 KG/M2 | WEIGHT: 232 LBS | SYSTOLIC BLOOD PRESSURE: 130 MMHG | OXYGEN SATURATION: 98 % | DIASTOLIC BLOOD PRESSURE: 82 MMHG | RESPIRATION RATE: 18 BRPM | HEART RATE: 75 BPM

## 2024-02-27 DIAGNOSIS — M25.511 CHRONIC RIGHT SHOULDER PAIN: Primary | ICD-10-CM

## 2024-02-27 DIAGNOSIS — R42 DIZZINESS: ICD-10-CM

## 2024-02-27 DIAGNOSIS — G89.29 CHRONIC RIGHT SHOULDER PAIN: Primary | ICD-10-CM

## 2024-02-27 PROCEDURE — 99214 OFFICE O/P EST MOD 30 MIN: CPT | Performed by: NURSE PRACTITIONER

## 2024-02-27 RX ORDER — MECLIZINE HYDROCHLORIDE 25 MG/1
25 TABLET ORAL 3 TIMES DAILY PRN
Qty: 30 TABLET | Refills: 1 | Status: SHIPPED | OUTPATIENT
Start: 2024-02-27

## 2024-02-27 NOTE — PROGRESS NOTES
"Subjective   Jacqueline Mcdowell is a 53 y.o. female.     History of Present Illness   Patient presents with c/o dizziness that started about 5 days ago.  She reports that she has pain that starts in the back of her head and it radiates to front. She reports that dizziness is worse when she is wearing glasses or contacts. She reports that she has eye appointment later this week. She reports that when she moves her eyes to side she gets dizzy. She has had vertigo previously and was seen by ENT.     Patient present with c/o right shoulder pain that started in \"2020\". She reports that it is in her mid back and when she puts chin to chest she feels a pull in her posterior neck. She has seen chiropractor for her back and this did not help. She's also changed her work chair several times.     The following portions of the patient's history were reviewed and updated as appropriate: allergies, current medications, past family history, past medical history, past social history, past surgical history and problem list.    Review of Systems   Constitutional:  Negative for chills, fatigue and fever.   Eyes:  Negative for blurred vision, double vision, photophobia and visual disturbance.   Respiratory:  Negative for cough, chest tightness and shortness of breath.    Cardiovascular:  Negative for chest pain, palpitations and leg swelling.   Endocrine: Negative for heat intolerance.   Genitourinary:  Negative for flank pain, frequency and urgency.   Musculoskeletal:  Positive for arthralgias. Negative for back pain.   Neurological:  Positive for dizziness. Negative for tremors, syncope, weakness, headache, memory problem and confusion.   Psychiatric/Behavioral:  Negative for agitation, decreased concentration, hallucinations, sleep disturbance, suicidal ideas, depressed mood and stress. The patient is not nervous/anxious.        Objective   Physical Exam  Vitals and nursing note reviewed.   Constitutional:       Appearance: She is " well-developed.   HENT:      Head: Normocephalic and atraumatic.      Right Ear: External ear normal.      Left Ear: External ear normal.      Nose: Nose normal.   Eyes:      Conjunctiva/sclera: Conjunctivae normal.      Pupils: Pupils are equal, round, and reactive to light.   Neck:      Thyroid: No thyromegaly.   Cardiovascular:      Rate and Rhythm: Normal rate and regular rhythm.      Heart sounds: Normal heart sounds. No murmur heard.  Pulmonary:      Effort: Pulmonary effort is normal.      Breath sounds: Normal breath sounds.   Musculoskeletal:      Cervical back: Normal range of motion and neck supple.   Lymphadenopathy:      Cervical: No cervical adenopathy.   Neurological:      General: No focal deficit present.      Mental Status: She is alert and oriented to person, place, and time.      Cranial Nerves: Cranial nerves 2-12 are intact. No cranial nerve deficit.      Sensory: Sensation is intact. No sensory deficit.      Motor: Motor function is intact. No tremor, atrophy or abnormal muscle tone.      Coordination: Coordination is intact. Coordination normal.      Gait: Gait is intact. Gait normal.      Deep Tendon Reflexes: Reflexes normal.   Psychiatric:         Speech: Speech normal.         Behavior: Behavior normal.         Thought Content: Thought content normal.         Judgment: Judgment normal.         Vitals:    02/27/24 1610   BP: 130/82   Pulse: 75   Resp: 18   SpO2: 98%     Body mass index is 34.36 kg/m².      Procedures    Assessment & Plan   Problems Addressed this Visit    None  Visit Diagnoses       Chronic right shoulder pain    -  Primary    Relevant Orders    Ambulatory Referral to Orthopedic Surgery    Dizziness        Relevant Medications    meclizine (ANTIVERT) 25 MG tablet    Other Relevant Orders    Ambulatory Referral to Orthopedic Surgery    Ambulatory Referral to Physical Therapy Vestibular (Completed)    Comprehensive Metabolic Panel    CBC & Differential    TSH           Diagnoses         Codes Comments    Chronic right shoulder pain    -  Primary ICD-10-CM: M25.511, G89.29  ICD-9-CM: 719.41, 338.29     Dizziness     ICD-10-CM: R42  ICD-9-CM: 780.4           Follow-up with eye doctor as discussed  Meclizine 25mg 1p.o.TID PRN  Referral to orthopedic surgery  Referral to physical therapy  CMP  CBC  TSH  Patient advised to go to ER if symptoms worsen or become severe.            Return if symptoms worsen or fail to improve, for Please schedule labs ASAP.

## 2024-02-27 NOTE — PATIENT INSTRUCTIONS
Follow-up with eye doctor as discussed  Patient advised to go to ER if symptoms worsen or become severe.   Return if symptoms worsen or fail to improve, for Please schedule labs ASAP.

## 2024-03-02 LAB
ALBUMIN SERPL-MCNC: 4.4 G/DL (ref 3.5–5.2)
ALBUMIN/GLOB SERPL: 1.3 G/DL
ALP SERPL-CCNC: 88 U/L (ref 39–117)
ALT SERPL-CCNC: 25 U/L (ref 1–33)
AST SERPL-CCNC: 20 U/L (ref 1–32)
BASOPHILS # BLD AUTO: 0.05 10*3/MM3 (ref 0–0.2)
BASOPHILS NFR BLD AUTO: 0.7 % (ref 0–1.5)
BILIRUB SERPL-MCNC: 0.2 MG/DL (ref 0–1.2)
BUN SERPL-MCNC: 14 MG/DL (ref 6–20)
BUN/CREAT SERPL: 28 (ref 7–25)
CALCIUM SERPL-MCNC: 9.8 MG/DL (ref 8.6–10.5)
CHLORIDE SERPL-SCNC: 99 MMOL/L (ref 98–107)
CO2 SERPL-SCNC: 26.7 MMOL/L (ref 22–29)
CREAT SERPL-MCNC: 0.5 MG/DL (ref 0.57–1)
EGFRCR SERPLBLD CKD-EPI 2021: 113 ML/MIN/1.73
EOSINOPHIL # BLD AUTO: 0.31 10*3/MM3 (ref 0–0.4)
EOSINOPHIL NFR BLD AUTO: 4.3 % (ref 0.3–6.2)
ERYTHROCYTE [DISTWIDTH] IN BLOOD BY AUTOMATED COUNT: 13.4 % (ref 12.3–15.4)
GLOBULIN SER CALC-MCNC: 3.4 GM/DL
GLUCOSE SERPL-MCNC: 93 MG/DL (ref 65–99)
HCT VFR BLD AUTO: 38.1 % (ref 34–46.6)
HGB BLD-MCNC: 12.3 G/DL (ref 12–15.9)
IMM GRANULOCYTES # BLD AUTO: 0.03 10*3/MM3 (ref 0–0.05)
IMM GRANULOCYTES NFR BLD AUTO: 0.4 % (ref 0–0.5)
LYMPHOCYTES # BLD AUTO: 2.52 10*3/MM3 (ref 0.7–3.1)
LYMPHOCYTES NFR BLD AUTO: 34.8 % (ref 19.6–45.3)
MCH RBC QN AUTO: 26.5 PG (ref 26.6–33)
MCHC RBC AUTO-ENTMCNC: 32.3 G/DL (ref 31.5–35.7)
MCV RBC AUTO: 81.9 FL (ref 79–97)
MONOCYTES # BLD AUTO: 0.84 10*3/MM3 (ref 0.1–0.9)
MONOCYTES NFR BLD AUTO: 11.6 % (ref 5–12)
NEUTROPHILS # BLD AUTO: 3.49 10*3/MM3 (ref 1.7–7)
NEUTROPHILS NFR BLD AUTO: 48.2 % (ref 42.7–76)
NRBC BLD AUTO-RTO: 0 /100 WBC (ref 0–0.2)
PLATELET # BLD AUTO: 449 10*3/MM3 (ref 140–450)
POTASSIUM SERPL-SCNC: 4.5 MMOL/L (ref 3.5–5.2)
PROT SERPL-MCNC: 7.8 G/DL (ref 6–8.5)
RBC # BLD AUTO: 4.65 10*6/MM3 (ref 3.77–5.28)
SODIUM SERPL-SCNC: 138 MMOL/L (ref 136–145)
TSH SERPL DL<=0.005 MIU/L-ACNC: 1.21 UIU/ML (ref 0.27–4.2)
WBC # BLD AUTO: 7.24 10*3/MM3 (ref 3.4–10.8)

## 2024-03-15 NOTE — PROGRESS NOTES
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    olmesartan-hydrochlorothiazide (BENICAR HCT) 40-12.5 MG per tablet TAKE 1 TABLET BY MOUTH DAILY    rosuvastatin (CRESTOR) 10 MG tablet TAKE 1 TABLET BY MOUTH DAILY     No current facility-administered medications on file prior to visit.     Current Medications:  Scheduled Meds:  Continuous Infusions:No current facility-administered medications for this visit.    PRN Meds:.    Past Medical History:   Diagnosis Date    Acid reflux     Eczema 07/01/2020    Hyperlipidemia     Hypertension         Past Surgical History:   Procedure Laterality Date    BREAST BIOPSY Bilateral     COLONOSCOPY      COLONOSCOPY N/A 6/18/2021    Procedure: COLONOSCOPY into cecum and terminal ileum;  Surgeon: Timur Taylor MD;  Location: [facility] ENDOSCOPY;  Service: Gastroenterology;  Laterality: N/A;  Screening  Post op: Normal    ENDOSCOPY N/A 6/18/2021    Procedure: ESOPHAGOGASTRODUODENOSCOPY with biopsies;  Surgeon: Timur Taylor MD;  Location: [facility] ENDOSCOPY;  Service: Gastroenterology;  Laterality: N/A;  Pre op: Reflux  Post op: Gastritis and Hiatal Hernia    ESSURE  "TUBAL LIGATION      FOOT SURGERY Bilateral     KNEE ARTHROSCOPY Left         Social History     Occupational History    Not on file   Tobacco Use    Smoking status: Former     Current packs/day: 0.00     Average packs/day: 0.3 packs/day for 1 year (0.2 ttl pk-yrs)     Types: Cigarettes     Start date: 4/15/2017     Quit date: 3/28/2018     Years since quittin.9    Smokeless tobacco: Former   Vaping Use    Vaping status: Never Used   Substance and Sexual Activity    Alcohol use: Yes     Comment: Maybe once every two months    Drug use: No    Sexual activity: Not Currently     Partners: Male     Birth control/protection: Condom      Social History     Social History Narrative    Works at [employer] and part-time at [employer].         Family History   Problem Relation Age of Onset    Diabetes Mother     Hypertension Mother     Hyperlipidemia Mother     Cancer Father     Heart disease Father     Breast cancer Maternal Grandmother     Malig Hyperthermia Neg Hx              Review of Systems:     Review of Systems      Physical Exam: 53 y.o. female  General Appearance:    Alert, cooperative, in no acute distress                   Vitals:    24 1537   Temp: 98.2 °F (36.8 °C)   Weight: 107 kg (236 lb)   Height: 175.3 cm (69\")   PainSc:   4      Patient is alert and read ×3 no acute distress appears her above-listed at height weight and age.  Affect is normal respiratory rate is normal unlabored. Heart rate regular rate rhythm, sclera, dentition and hearing are normal for the purpose of this exam.    Ortho Exam  Physical exam of the l right shoulder reveals no overlying skin changes no lymphedema no lymphadenopathy.  The patient can actively flex to 180, abduction is similar external rotation is to 50, internal rotation to the upper lumbar spine.  Rotator cuff strength is 4+ to 5 over 5 with isometric strength testing without symptoms.  Patient has good distal pulses she does have decrease in cervical rotation " particularly sidebending and rotation and some of these do reproduce her symptoms she has a pretty rounded shoulder poor forward head posture  Procedures          Radiology:   AP, Scapular Y and Axillary Lateral of the right shoulder were ordered/reviewed to evauate shoulder pain.  I have no comparative films she has some mild acromioclavicular arthritis no other acute pathologyOkay to complete grooming also took AP and lateral cervical spine with no comparative films she has marked decrease in cervical lordosis she has degenerative changes most pronounced at 6 7 but also 5 6  Imaging Results (Most Recent)       Procedure Component Value Units Date/Time    XR Shoulder 2+ View Right [[id number]] Resulted: 03/18/24 1522     Updated: 03/18/24 1522    Impression:      Ordering physician's impression is located in the Encounter Note dated 03/18/24. X-ray performed in the DR room.            Assessment/Plan: Right shoulder pain I really think this is coming from her neck and her posture plan is to start her on some meloxicam with strict precautions for short period of time I will have her see physical therapy at [place] to work on core strengthening and postural exercises I will have her see Adalgisa in about 5 weeks  Prescription medication given with instructions, warnings, and precautions.

## 2024-03-18 ENCOUNTER — OFFICE VISIT (OUTPATIENT)
Dept: ORTHOPEDIC SURGERY | Facility: CLINIC | Age: 54
End: 2024-03-18
Payer: COMMERCIAL

## 2024-03-18 VITALS — TEMPERATURE: 98.2 F | WEIGHT: 236 LBS | HEIGHT: 69 IN | BODY MASS INDEX: 34.96 KG/M2

## 2024-03-18 DIAGNOSIS — R52 PAIN: Primary | ICD-10-CM

## 2024-03-18 DIAGNOSIS — M54.2 NECK PAIN: ICD-10-CM

## 2024-03-18 RX ORDER — MELOXICAM 15 MG/1
TABLET ORAL
Qty: 30 TABLET | Refills: 0 | Status: SHIPPED | OUTPATIENT
Start: 2024-03-18

## 2024-03-20 ENCOUNTER — PATIENT ROUNDING (BHMG ONLY) (OUTPATIENT)
Dept: ORTHOPEDIC SURGERY | Facility: CLINIC | Age: 54
End: 2024-03-20
Payer: COMMERCIAL

## 2024-03-20 NOTE — PROGRESS NOTES
A [email or web address] Message has been sent to the patient [place] [facility] [place]

## 2024-04-04 DIAGNOSIS — I10 ESSENTIAL HYPERTENSION: ICD-10-CM

## 2024-04-04 DIAGNOSIS — E78.2 MIXED HYPERLIPIDEMIA: ICD-10-CM

## 2024-04-04 RX ORDER — AMLODIPINE BESYLATE 5 MG/1
5 TABLET ORAL DAILY
Qty: 90 TABLET | Refills: 3 | Status: SHIPPED | OUTPATIENT
Start: 2024-04-04 | End: 2024-04-04 | Stop reason: SDUPTHER

## 2024-04-04 RX ORDER — AMLODIPINE BESYLATE 5 MG/1
5 TABLET ORAL DAILY
Qty: 90 TABLET | Refills: 3 | Status: SHIPPED | OUTPATIENT
Start: 2024-04-04

## 2024-04-04 RX ORDER — ROSUVASTATIN CALCIUM 10 MG/1
10 TABLET, COATED ORAL DAILY
Qty: 30 TABLET | Refills: 0 | Status: SHIPPED | OUTPATIENT
Start: 2024-04-04

## 2024-04-15 ENCOUNTER — OFFICE VISIT (OUTPATIENT)
Dept: ORTHOPEDIC SURGERY | Facility: CLINIC | Age: 54
End: 2024-04-15
Payer: COMMERCIAL

## 2024-04-15 VITALS — WEIGHT: 232.6 LBS | BODY MASS INDEX: 34.45 KG/M2 | TEMPERATURE: 98.2 F | HEIGHT: 69 IN

## 2024-04-15 DIAGNOSIS — R52 PAIN: Primary | ICD-10-CM

## 2024-04-15 DIAGNOSIS — M25.511 PERISCAPULAR PAIN OF RIGHT SHOULDER: ICD-10-CM

## 2024-04-15 DIAGNOSIS — M50.30 DDD (DEGENERATIVE DISC DISEASE), CERVICAL: ICD-10-CM

## 2024-04-15 PROCEDURE — 72040 X-RAY EXAM NECK SPINE 2-3 VW: CPT | Performed by: NURSE PRACTITIONER

## 2024-04-15 PROCEDURE — 99213 OFFICE O/P EST LOW 20 MIN: CPT | Performed by: NURSE PRACTITIONER

## 2024-04-15 NOTE — PROGRESS NOTES
Patient Name: Jacqueline Mcdowell   YOB: 1970  Referring Primary Care Physician: Cristal Real APRN      Chief Complaint:    Chief Complaint   Patient presents with    Cervical Spine - Pain, Initial Evaluation        PREVIOUS TREATMENT:     MRI OF C-SPINE? No    PT FOR NECK PAIN? No          Neck Pain   This is a chronic problem. The current episode started more than 1 year ago. The problem occurs constantly. The problem has been gradually worsening. The pain is associated with nothing. The pain is present in the midline (Rt shoulder blade). The quality of the pain is described as aching and burning. The pain is at a severity of 5/10. The pain is moderate. Exacerbated by: sitting, Turning head left/right. Stiffness is present: Occasionally. Associated symptoms include weakness. Pertinent negatives include no chest pain, fever, headaches, numbness or tingling. She has tried NSAIDs, ice and heat for the symptoms. The treatment provided mild relief.        HPI:  Jacqueline Mcdowell is a 53 y.o. female who presents to [facility] for evaluation of above complaints.  Primarily complaining of pain medial to the right scapular border.  She is left-hand dominant and works a lot on a computer at home.  She denies any specific injury.  Pain has been present for at least 4 years, progressively getting worse.  She recently saw Dr. España to workup the shoulder and was placed on Mak Luxiq him and sent to physical therapy.  So far meloxicam has not offered much relief.  She was also evaluated by a chiropractor which did not offer relief either.  She starts therapy later this week.  No bowel or bladder dysfunction or saddle anesthesia.  Prior pertinent records were reviewed.  PFSH:  See attached    ROS: As per HPI, otherwise negative    Objective:      53 y.o. female  Body mass index is 34.35 kg/m²., 106 kg (232 lb 9.6 oz), @HT@  Vitals:    04/15/24 1535   Temp: 98.2 °F (36.8 °C)     Pain Score     04/15/24 1535   PainSc:   5   PainLoc: Neck            Spine Musculoskeletal Exam    Gait    Gait is normal.    Palpation    Cervical Spine    Tenderness: present      Trapezius: right and left      Periscapular: right    Strength    Cervical Spine    Cervical spine motor exam is normal.    Sensory    Cervical Spine    Cervical spine sensation is normal.    Reflexes    Right        Biceps: hyporeflexic      Brachioradialis: hyporeflexic      Triceps: hyporeflexic      Salgado: absent    Left        Biceps: hyporeflexic        Brachioradialis: hyporeflexic      Triceps: hyporeflexic      Salgado: absent    Neurovascular    Cervical Spine      Right      Radial pulse: normal      Left      Radial pulse: normal    General      Constitutional: well-developed and well-nourished    Scleral icterus: no    Labored breathing: no    Psychiatric: normal mood and affect and no acute distress    Neurological: alert and oriented x3    Skin: intact        IMAGING:     Indication: pain related symptoms,  Views: 2V AP&LAT cervical  Findings: Reviewed and reveals loss of normal cervical lordosis, mild multilevel degenerative change most pronounced lower cervical spine  Comparison views: None    Assessment:           Diagnoses and all orders for this visit:    1. Pain (Primary)  -     XR Spine Cervical 2 View    2. Periscapular pain of right shoulder    3. DDD (degenerative disc disease), cervical             Plan:  I agree to start with physical therapy.  She asks hello the cervical spine can be related to scapular pain, that often this is a radicular symptom from the lower cervical spine where she does have more of the degenerative change.  Therapy most likely will be able to work out the pain, however also advised her in the meantime to try massage against a wall with a tennis ball or something similar and try massaging with an ice bottle against a wall or the floor as well.  She will notify the office if symptoms do not improve  with physical therapy and at that point would recommend cervical MRI without contrast with an eye toward injection therapy.      Return if symptoms worsen or fail to improve.    EMR Dragon/Transcription Disclaimer:   Much of this encounter note is an electronic transcription/translation of spoken language to printed text. The electronic translation of spoken language may permit erroneous, or at times, nonsensical words or phrases to be inadvertently transcribed; Although I have reviewed the note for such errors, some may still exist.  Red flags have been discussed at this or previous visits to include but not limited weakness in extremities, worsening pain that does not respond to conservative treatment and bowel or bladder dysfunction. These are reasons to present to ER and patient has been informed.    The diagnosis(es), natural history, pathophysiology and treatment for diagnosis(es) were discussed. Opportunity given and questions answered. Biomechanics of pertinent body areas discussed.    EXERCISES:  Advice on benefits of, and types of regular/moderate exercise pertaining to diagnosis.  Continue HEP. For back or neck pain, recommend pilates and or yoga as tolerated. Generally it is best to start any new exercise under the guidance of a  or therapist.   MEDICATIONS:  When prescribe, the risks, benefits, warnings,side effects and alternatives of medications discussed. Advised against long term use of narcotics.   PAIN CONTROL:  Cold, heat, OTC lidocaine patches and/or ointment as needed. Avoid direct skin contact with ice. Ice 15-20 minutes 3-4 times daily as needed. For SI joint pain, recommend ice bath in water about 50 degrees for 5 consecutive days, add ice slowly to help with adjustment and may cover with warm towel or robe to help with cold tolerance. If using lidocaine, do not apply heat in conjunction as this can cause a burn.   MEDICAL RECORDS reviewed from other provider(s) for past and current  medical history pertinent to this visit..

## 2024-04-19 ENCOUNTER — TREATMENT (OUTPATIENT)
Age: 54
End: 2024-04-19
Payer: COMMERCIAL

## 2024-04-19 DIAGNOSIS — M50.30 DDD (DEGENERATIVE DISC DISEASE), CERVICAL: ICD-10-CM

## 2024-04-19 DIAGNOSIS — M25.511 PERISCAPULAR PAIN OF RIGHT SHOULDER: ICD-10-CM

## 2024-04-19 DIAGNOSIS — M54.2 PAIN, NECK: Primary | ICD-10-CM

## 2024-04-19 NOTE — PATIENT INSTRUCTIONS
Access Code: [email or web address]  URL: [email or web address]  Date: 04/19/2024  Prepared by: Annabella Montejo    Exercises  - Supine Thoracic Mobilization Towel Roll Vertical with Arm Stretch  - 1 x daily - 7 x weekly - 1 sets - 10 reps  - Standing Backward Shoulder Rolls  - 1 x daily - 7 x weekly - 1 sets - 10 reps  - Standing Scapular Retraction  - 1 x daily - 7 x weekly - 1 sets - 10 reps

## 2024-04-19 NOTE — PROGRESS NOTES
Physical Therapy Initial Evaluation and Plan of Care  [facility]   [address] [place] [address]  P: [phone]       F: [phone]       Patient: Jacqueline Mcdowell   : 1970  Visit Diagnoses:     ICD-10-CM ICD-9-CM   1. Pain, neck  M54.2 723.1   2. Periscapular pain of right shoulder  M25.511 719.41   3. DDD (degenerative disc disease), cervical  M50.30 722.4     Referring practitioner: Steffany España MD  Date of Initial Visit: 2024  Today's Date: 2024  Patient seen for 1 sessions           Subjective Questionnaire: NDI:      Subjective Evaluation    History of Present Illness  Date of onset: 3/18/2024  Mechanism of injury: Patient reports she has had pain in her neck and shoulder area for about 4 years ago.  States no trauma/injury.  States she works long hours and her pain increased the longer she sits.      No HA, N/T in tips of index and middle fingers on both hands.          PMH: chronic shoulder pain, HTN, hyperlipidemia, chest pain, anxiety, panic attacks, acid reflux, eczema,     Subjective comment: Patient reports neck and right shoulder/scapular pain.  Patient Occupation: [employer] based; [employer] part time- Pain  At best pain ratin  At worst pain ratin  Location: neck and right scapular region  Quality: burning and sharp  Relieving factors: rest and change in position  Aggravating factors: keyboarding, prolonged positioning and ambulation (prolonged sitting, head down postures)    Social Support  Lives in: multiple-level home  Lives with: spouse    Hand dominance: left    Diagnostic Tests  X-ray: abnormal (cervical spine  Reviewed and reveals loss of normal cervical lordosis, mild multilevel degenerative change most pronounced lower cervical spine)    Treatments  Previous treatment: chiropractic and medication  Patient Goals  Patient goals for therapy: decreased pain  Patient goal: To be able to tolerated  regular activities better.           Objective          Palpation   Left   Hypertonic in the cervical paraspinals, levator scapulae, pectoralis major, pectoralis minor, suboccipitals and upper trapezius.   Tenderness of the cervical paraspinals, levator scapulae, suboccipitals and upper trapezius.   Trigger point to upper trapezius.     Right   Hypertonic in the cervical paraspinals, levator scapulae, pectoralis major, pectoralis minor, rhomboids, suboccipitals and upper trapezius. Tenderness of the cervical paraspinals, levator scapulae, rhomboids, suboccipitals and upper trapezius.   Trigger point to upper trapezius.     Tenderness   Cervical Spine   Tenderness in the left 1st rib.     Additional Tenderness Details  Elevated and hypomobile left 1st rib    Active Range of Motion   Cervical/Thoracic Spine   Cervical    Flexion: 55 degrees   Extension: 42 degrees with pain  Left lateral flexion: 30 degrees   Right lateral flexion: 30 degrees   Left rotation: 48 degrees   Right rotation: 60 degrees with pain  Left Shoulder   Normal active range of motion    Right Shoulder   Normal active range of motion    Left Elbow   Normal active range of motion    Right Elbow   Normal active range of motion    Left Wrist   Normal active range of motion    Right Wrist   Normal active range of motion    Strength/Myotome Testing     Left Shoulder   Normal muscle strength    Right Shoulder   Normal muscle strength    Left Elbow   Normal strength    Right Elbow   Normal strength    Left Wrist/Hand   Normal wrist strength     (2nd hand position)   Left  strength (2nd hand position) 38 lbs    Right Wrist/Hand   Normal wrist strength     (2nd hand position)   Right  strength (2nd hand position) 45 lbs    Tests   Cervical     Right   Positive Spurling's sign.           Assessment & Plan       Assessment  Impairments: abnormal muscle tone, abnormal or restricted ROM, impaired physical strength and pain with function    Functional limitations: sleeping and uncomfortable because of pain   Assessment details: Jacqueline Mcdowell is a pleasant 53 y.o. female that presents with signs and symptoms consistent with the above diagnosis. Pt will benefit from skilled PT services in order to address listed impairments, decrease pain and restore function.    Prognosis: good  Prognosis details: Patient demonstrates good rehab potential as evidenced by high motivation to participate with PT POC and to return to PLOF/ADLs/IADLs.    Goals  Plan Goals: Short Term Goals (3 wks):  1.  Patient will have increased cervical spine ROM to WFL.  2.  Patient will have left first rib mobility WNL.  3.  Patient will have C/T junction mobility WNL.  4.  Patient will normalized muscle tone in cervical/UQ muscles.      Long Term Goals (6 wks):  1.  Patient will be independent in performance of HEP.  2.  Patient will have improved NDI score of 5/50 or better.  3.  Patient will improved cervical/scapular muscle flexibility WFL.  4.  Patient will report decreased pain with sitting to perform work tasks with increased comfort.    Plan  Therapy options: will be seen for skilled therapy services  Planned modality interventions: cryotherapy, thermotherapy (hydrocollator packs), TENS, dry needling, traction and ultrasound  Planned therapy interventions: manual therapy, soft tissue mobilization, spinal/joint mobilization, strengthening, stretching, flexibility, functional ROM exercises, joint mobilization, home exercise program, neuromuscular re-education, postural training, therapeutic activities and body mechanics training  Frequency: 2x week  Duration in weeks: 6  Treatment plan discussed with: patient  Plan details: Pt was educated on the importance of their HEP and their current need for continued skilled physical therapy. Patients goals and potential limitations were discussed and pt is in agreement with current plan of care and treatment emphasis.              History #  of Personal Factors and/or Comorbidities: HIGH (3+)  Examination of Body System(s): # of elements: MODERATE (3)  Clinical Presentation: STABLE   Clinical Decision Making: MODERATE      Timed:         Manual Therapy:    12     mins  50807;     Therapeutic Exercise:    10     mins  34050;     Neuromuscular Chance:        mins  38332;    Therapeutic Activity:     10     mins  36001;     Gait Training:           mins  29915;     Ultrasound:          mins  25935;    Ionto                                  mins  98263  Self Care                            mins  74146  Canalith Repos         mins  52859  Orthotic MGMT/Train         mins  46594    Un-Timed:  Electrical Stimulation:         mins  59073 ( );  Dry Needling:          mins  88251 self-pay;  Dry Needling:          mins  27581 self-pay  Traction          mins  88724  Low Eval          mins  77297  Mod Eval     30     mins  65958  High Eval                            mins  18968    Timed Treatment:   32   mins   Total Treatment:     62   mins      PT SIGNATURE: Annabella Montejo PT     License Number: [id number]  Electronically signed by Annabella Montejo PT, 04/19/24, 1:13 PM EDT      DATE TREATMENT INITIATED: 4/19/2024    Initial Certification  Certification Period: 4/19/2024 thru 7/17/2024  I certify that the therapy services are furnished while this patient is under my care.  The services outlined above are required by this patient, and will be reviewed every 90 days.     PHYSICIAN: Steffany España MD      NPI: [id number]  DATE:         Please sign and return via fax to [phone]. Thank you, [facility].

## 2024-05-03 ENCOUNTER — TREATMENT (OUTPATIENT)
Age: 54
End: 2024-05-03
Payer: COMMERCIAL

## 2024-05-03 DIAGNOSIS — M25.511 PERISCAPULAR PAIN OF RIGHT SHOULDER: ICD-10-CM

## 2024-05-03 DIAGNOSIS — M50.30 DDD (DEGENERATIVE DISC DISEASE), CERVICAL: ICD-10-CM

## 2024-05-03 DIAGNOSIS — M54.2 PAIN, NECK: Primary | ICD-10-CM

## 2024-05-03 PROCEDURE — 97110 THERAPEUTIC EXERCISES: CPT | Performed by: PHYSICAL THERAPIST

## 2024-05-03 PROCEDURE — 97140 MANUAL THERAPY 1/> REGIONS: CPT | Performed by: PHYSICAL THERAPIST

## 2024-05-03 NOTE — PROGRESS NOTES
Physical Therapy Treatment Note  [facility] [place]   [address] [place] [address]  P: [phone]       F: [phone]      Patient: Jacqueline Mcdowell   : 1970  Treatment Diagnosis:     ICD-10-CM ICD-9-CM   1. Pain, neck  M54.2 723.1   2. Periscapular pain of right shoulder  M25.511 719.41   3. DDD (degenerative disc disease), cervical  M50.30 722.4     Referring practitioner: Steffany España MD  Date of Initial Visit: Type: THERAPY  Noted: 2024  Today's Date: 5/3/2024  Patient seen for 2 sessions           Subjective   Patient reports her neck has been feeling better.  States she has been having a lot of pain in her left knee. Had been doing a lot of walking-did wear a knee brace.      Objective     See Exercise, Manual, and Modality Logs for complete treatment.       Assessment/Plan  Patient responded positively to manual therapy with improved muscle tone and flexibility.  She was able to perform progressed stretching exercises without additional relief.  Will continue to progress as tolerated.  Progress per Plan of Care and Progress strengthening /stabilization /functional activity           Timed:         Manual Therapy:    24     mins  99396;     Therapeutic Exercise:    10     mins  66411;     Neuromuscular Chance:        mins  78430;    Therapeutic Activity:          mins  15584;     Gait Training:           mins  51050;     Ultrasound:          mins  26593;    Ionto                                  mins  89984  Self Care                            mins  09324  Canalith Repos         mins  80171  Orthotic MGMT/Train         mins  25950    Un-Timed:  Electrical Stimulation:         mins  46875 ( );  Dry Needling:          mins  86719 self-pay;  Dry Needling:          mins  86215 self-pay  Traction          mins  74365      Timed Treatment:   34   mins   Total Treatment:     34   mins        PT SIGNATURE: Annabella Montejo PT     License Number:  [id number]  Electronically signed by Annabella Montejo PT, 05/03/24, 2:03 PM EDT

## 2024-05-10 ENCOUNTER — TREATMENT (OUTPATIENT)
Age: 54
End: 2024-05-10
Payer: COMMERCIAL

## 2024-05-10 DIAGNOSIS — M50.30 DDD (DEGENERATIVE DISC DISEASE), CERVICAL: ICD-10-CM

## 2024-05-10 DIAGNOSIS — M25.511 PERISCAPULAR PAIN OF RIGHT SHOULDER: ICD-10-CM

## 2024-05-10 DIAGNOSIS — M54.2 PAIN, NECK: Primary | ICD-10-CM

## 2024-05-10 PROCEDURE — 97110 THERAPEUTIC EXERCISES: CPT | Performed by: PHYSICAL THERAPIST

## 2024-05-10 PROCEDURE — 97140 MANUAL THERAPY 1/> REGIONS: CPT | Performed by: PHYSICAL THERAPIST

## 2024-05-17 ENCOUNTER — TREATMENT (OUTPATIENT)
Age: 54
End: 2024-05-17
Payer: COMMERCIAL

## 2024-05-17 DIAGNOSIS — M25.511 PERISCAPULAR PAIN OF RIGHT SHOULDER: ICD-10-CM

## 2024-05-17 DIAGNOSIS — M54.2 PAIN, NECK: Primary | ICD-10-CM

## 2024-05-17 DIAGNOSIS — M50.30 DDD (DEGENERATIVE DISC DISEASE), CERVICAL: ICD-10-CM

## 2024-05-17 NOTE — PROGRESS NOTES
Physical Therapy Re-Assessment   [facility] [place]   [address] [place] [address]  P: [phone]       F: [phone]        Patient: Jacqueline Mcdowell   : 1970  Visit Diagnoses:     ICD-10-CM ICD-9-CM   1. Pain, neck  M54.2 723.1   2. Periscapular pain of right shoulder  M25.511 719.41   3. DDD (degenerative disc disease), cervical  M50.30 722.4     Referring practitioner: Steffany España MD  Date of Initial Visit: Type: THERAPY  Noted: 2024  Today's Date: 2024  Patient seen for 4 sessions      Subjective:   Jacqueline Mcdowell reports:   Subjective Questionnaire: NDI:   Clinical Progress: improved  Home Program Compliance: Yes  Treatment has included: therapeutic exercise, neuromuscular re-education, manual therapy, and therapeutic activity    Subjective   Patient reports her neck pain has improved but still having mild pain.  States she is having the worst time sleeping due to having to move multiple times through the night.  Reports her sleep positioning and comfort is also limited due to her left knee pain.     Objective     Palpation   TTP T 3-6     Tenderness   Cervical Spine   No TTP of the left 1st rib.         Active Range of Motion   Cervical/Thoracic Spine   Cervical     WFL, pain with extension and rotation     Strength/Myotome Testing      Left Shoulder   Normal muscle strength     Right Shoulder   Normal muscle strength     Left Elbow   Normal strength     Right Elbow   Normal strength     Left Wrist/Hand   Normal wrist strength       Tests   Cervical      Right   Positive Spurling's sign.      See Exercise, Manual, and Modality Logs for complete treatment.     Assessment/Plan  Patient presents with improved muscle tone in cervical/UQ muscles and decreased TTP as well.  She still have limited mobility and discomfort in the upper thoracic spine and cervical spine ROM.  She has improved with treatment and will benefit from continued PT.     Progress toward previous goals: Partially Met    Goal Review  Short Term Goals (2 wks):  1.  Patient will have increased cervical spine ROM to WFL.-met  2.  Patient will have left first rib mobility WNL.-met  3.  Patient will have C/T junction mobility WNL.-progressing  4.  Patient will normalized muscle tone in cervical/UQ muscles.-met        Long Term Goals (4 wks):  1.  Patient will be independent in performance of HEP.-progressing  2.  Patient will have improved NDI score of 5/50 or better.-met  3.  Patient will improved cervical/scapular muscle flexibility WFL.-progressing  4.  Patient will report decreased pain with sitting to perform work tasks with increased comfort.-progressing      Recommendations: Continue as planned  Timeframe: 1 month  Prognosis to achieve goals: good    PT SIGNATURE: Annabella Montejo PT     License Number: [id number]  Electronically signed by Annabella Montejo PT, 05/17/24, 2:36 PM EDT        Timed:         Manual Therapy:    15     mins  64407;     Therapeutic Exercise:     10    mins  04158;     Neuromuscular Chance:        mins  11946;    Therapeutic Activity:     10     mins  51723;     Gait Training:           mins  31160;     Ultrasound:          mins  51913;    Ionto                                  mins  64791  Self Care                            mins  24856  Canalith Repos         mins  38729  Orthotic MGMT/Train         mins  50932    Un-Timed:  Electrical Stimulation:         mins  78186 ( );  Dry Needling:          mins  20090 self-pay;  Dry Needling:          mins  58243 self-pay  Traction          mins  63306  Low Eval          mins  02514  Mod Eval          mins  58871  High Eval                            mins  36740    Timed Treatment:   35   mins   Total Treatment:     35   mins

## 2024-05-23 ENCOUNTER — OFFICE VISIT (OUTPATIENT)
Dept: FAMILY MEDICINE CLINIC | Facility: CLINIC | Age: 54
End: 2024-05-23
Payer: COMMERCIAL

## 2024-05-23 VITALS
DIASTOLIC BLOOD PRESSURE: 78 MMHG | SYSTOLIC BLOOD PRESSURE: 128 MMHG | RESPIRATION RATE: 18 BRPM | HEIGHT: 69 IN | HEART RATE: 85 BPM | BODY MASS INDEX: 34.07 KG/M2 | OXYGEN SATURATION: 98 % | WEIGHT: 230 LBS

## 2024-05-23 DIAGNOSIS — R10.12 LEFT UPPER QUADRANT PAIN: Primary | ICD-10-CM

## 2024-05-23 LAB
ALBUMIN SERPL-MCNC: 4.3 G/DL (ref 3.5–5.2)
ALBUMIN/GLOB SERPL: 1.2 G/DL
ALP SERPL-CCNC: 101 U/L (ref 39–117)
ALT SERPL-CCNC: 22 U/L (ref 1–33)
AST SERPL-CCNC: 21 U/L (ref 1–32)
BASOPHILS # BLD AUTO: 0.05 10*3/MM3 (ref 0–0.2)
BASOPHILS NFR BLD AUTO: 0.7 % (ref 0–1.5)
BILIRUB BLD-MCNC: NEGATIVE MG/DL
BILIRUB SERPL-MCNC: 0.3 MG/DL (ref 0–1.2)
BUN SERPL-MCNC: 9 MG/DL (ref 6–20)
BUN/CREAT SERPL: 12.3 (ref 7–25)
CALCIUM SERPL-MCNC: 9.9 MG/DL (ref 8.6–10.5)
CHLORIDE SERPL-SCNC: 99 MMOL/L (ref 98–107)
CLARITY, POC: CLEAR
CO2 SERPL-SCNC: 29 MMOL/L (ref 22–29)
COLOR UR: YELLOW
CREAT SERPL-MCNC: 0.73 MG/DL (ref 0.57–1)
EGFRCR SERPLBLD CKD-EPI 2021: 99.1 ML/MIN/1.73
EOSINOPHIL # BLD AUTO: 0.35 10*3/MM3 (ref 0–0.4)
EOSINOPHIL NFR BLD AUTO: 4.9 % (ref 0.3–6.2)
ERYTHROCYTE [DISTWIDTH] IN BLOOD BY AUTOMATED COUNT: 13.7 % (ref 12.3–15.4)
GLOBULIN SER CALC-MCNC: 3.5 GM/DL
GLUCOSE SERPL-MCNC: 93 MG/DL (ref 65–99)
GLUCOSE UR STRIP-MCNC: NEGATIVE MG/DL
HCT VFR BLD AUTO: 39.9 % (ref 34–46.6)
HGB BLD-MCNC: 12.8 G/DL (ref 12–15.9)
IMM GRANULOCYTES # BLD AUTO: 0.03 10*3/MM3 (ref 0–0.05)
IMM GRANULOCYTES NFR BLD AUTO: 0.4 % (ref 0–0.5)
KETONES UR QL: NEGATIVE
LEUKOCYTE EST, POC: NEGATIVE
LYMPHOCYTES # BLD AUTO: 2.28 10*3/MM3 (ref 0.7–3.1)
LYMPHOCYTES NFR BLD AUTO: 32 % (ref 19.6–45.3)
MCH RBC QN AUTO: 26.9 PG (ref 26.6–33)
MCHC RBC AUTO-ENTMCNC: 32.1 G/DL (ref 31.5–35.7)
MCV RBC AUTO: 84 FL (ref 79–97)
MONOCYTES # BLD AUTO: 0.76 10*3/MM3 (ref 0.1–0.9)
MONOCYTES NFR BLD AUTO: 10.7 % (ref 5–12)
NEUTROPHILS # BLD AUTO: 3.66 10*3/MM3 (ref 1.7–7)
NEUTROPHILS NFR BLD AUTO: 51.3 % (ref 42.7–76)
NITRITE UR-MCNC: NEGATIVE MG/ML
NRBC BLD AUTO-RTO: 0 /100 WBC (ref 0–0.2)
PH UR: 6 [PH] (ref 5–8)
PLATELET # BLD AUTO: 459 10*3/MM3 (ref 140–450)
POTASSIUM SERPL-SCNC: 4.4 MMOL/L (ref 3.5–5.2)
PROT SERPL-MCNC: 7.8 G/DL (ref 6–8.5)
PROT UR STRIP-MCNC: NEGATIVE MG/DL
RBC # BLD AUTO: 4.75 10*6/MM3 (ref 3.77–5.28)
RBC # UR STRIP: ABNORMAL /UL
SODIUM SERPL-SCNC: 139 MMOL/L (ref 136–145)
SP GR UR: 1.02 (ref 1–1.03)
UROBILINOGEN UR QL: NORMAL
WBC # BLD AUTO: 7.13 10*3/MM3 (ref 3.4–10.8)

## 2024-05-23 PROCEDURE — 81002 URINALYSIS NONAUTO W/O SCOPE: CPT | Performed by: NURSE PRACTITIONER

## 2024-05-23 PROCEDURE — 99214 OFFICE O/P EST MOD 30 MIN: CPT | Performed by: NURSE PRACTITIONER

## 2024-05-23 NOTE — PATIENT INSTRUCTIONS
Return if symptoms worsen or fail to improve.  Call with any questions or concerns.  If your symptoms worsen or become severe, go to an ER.

## 2024-05-23 NOTE — PROGRESS NOTES
Subjective   Jacqueline Mcdowell is a 53 y.o. female.     Chief Complaint   Patient presents with    Abdominal Pain     L upper x2 weeks, dull constant. Along with pain in lower stomach that feels like piercing pain        History of Present Illness   Patient presents with c/o left upper quadrant pain X 2 weeks, acute. She reports that intermittently with bending she gets a piercing pain in her lower abdomen. She denies any nausea, vomiting or urinary frequency or burning.  She reports intermittent constipation. No history of kidney stones.    The following portions of the patient's history were reviewed and updated as appropriate: allergies, current medications, past family history, past medical history, past social history, past surgical history and problem list.    Review of Systems   Constitutional:  Negative for appetite change, chills, fatigue and fever.   Respiratory:  Negative for cough, chest tightness, shortness of breath and wheezing.    Cardiovascular:  Negative for chest pain, palpitations and leg swelling.   Gastrointestinal:  Positive for abdominal distention, abdominal pain and constipation. Negative for anal bleeding, blood in stool, diarrhea, nausea, rectal pain, vomiting, GERD and indigestion.   Genitourinary: Negative.  Negative for flank pain.   Neurological:  Negative for dizziness and headache.       Objective   Physical Exam  Vitals and nursing note reviewed.   Constitutional:       Appearance: She is well-developed.   HENT:      Head: Normocephalic and atraumatic.   Eyes:      Conjunctiva/sclera: Conjunctivae normal.      Pupils: Pupils are equal, round, and reactive to light.   Neck:      Thyroid: No thyromegaly.   Cardiovascular:      Rate and Rhythm: Normal rate and regular rhythm.      Heart sounds: Normal heart sounds. No murmur heard.  Pulmonary:      Effort: Pulmonary effort is normal.      Breath sounds: Normal breath sounds.   Abdominal:      General: Bowel sounds are normal. There is no  distension.      Palpations: Abdomen is soft. There is no mass.      Tenderness: There is no abdominal tenderness. There is no right CVA tenderness, left CVA tenderness, guarding or rebound.      Hernia: No hernia is present.   Musculoskeletal:      Cervical back: Normal range of motion and neck supple.   Lymphadenopathy:      Cervical: No cervical adenopathy.   Skin:     General: Skin is warm and dry.   Neurological:      Mental Status: She is alert and oriented to person, place, and time.   Psychiatric:         Behavior: Behavior normal.         Thought Content: Thought content normal.         Judgment: Judgment normal.         Vitals:    05/23/24 0928   BP: 128/78   Pulse: 85   Resp: 18   SpO2: 98%     Body mass index is 33.97 kg/m².      Procedures    Assessment & Plan   Problems Addressed this Visit    None  Visit Diagnoses       Left upper quadrant pain    -  Primary    Relevant Orders    CBC & Differential    Comprehensive Metabolic Panel    CT Abdomen Pelvis With & Without Contrast    POC Urinalysis Dipstick    Urine Culture - Urine, Urine, Clean Catch          Diagnoses         Codes Comments    Left upper quadrant pain    -  Primary ICD-10-CM: R10.12  ICD-9-CM: 789.02           CBC  CMP  CT abdomen w/wo   POCT urinalysis  Urine culture  If your symptoms worsen or become severe, go to an ER.         Return if symptoms worsen or fail to improve.

## 2024-05-24 ENCOUNTER — TREATMENT (OUTPATIENT)
Age: 54
End: 2024-05-24
Payer: COMMERCIAL

## 2024-05-24 DIAGNOSIS — M50.30 DDD (DEGENERATIVE DISC DISEASE), CERVICAL: ICD-10-CM

## 2024-05-24 DIAGNOSIS — M54.2 PAIN, NECK: Primary | ICD-10-CM

## 2024-05-24 DIAGNOSIS — M25.511 PERISCAPULAR PAIN OF RIGHT SHOULDER: ICD-10-CM

## 2024-05-24 NOTE — PROGRESS NOTES
[facility]  New Problem      Patient Name: Jacqueline Mcdowell  : 1970  Primary Care Physician: Cristal Real APRN        Chief Complaint:  Left knee pain    HPI:   Jacqueline Mcdowell is a 53 y.o. year old who presents today for evaluation. Been going on for a few years. This past August/September things got worse. Last year when she was in [place] she noticed she was limping a little bit then her pain continued to worsen and she was needing assistance of a wheelchair. She saw Dr. Todd, [facility] and were reviewed.    She states she had it drained and placed steroid shot. Has had that twice now. Helped but not forever at least a couple of months.     Had arthroscopic surgery in the past maybe around ?    Doing PT right now. Took Meloxicam for a couple of weeks and it didn't help much.    Dr. Todd last notes suggested that the aspiration yielded 60cc of fluid, not infectious in origin. Presumably consistent with synovitis. He recommended an MRI should her symptoms return.        Past Medical/Surgical, Social and Family History:  I have reviewed and/or updated pertinent history as noted in the medical record including:  Past Medical History:   Diagnosis Date    Acid reflux     Eczema 2020    Hyperlipidemia     Hypertension      Past Surgical History:   Procedure Laterality Date    BREAST BIOPSY Bilateral     COLONOSCOPY      COLONOSCOPY N/A 2021    Procedure: COLONOSCOPY into cecum and terminal ileum;  Surgeon: Timur Taylor MD;  Location: [facility] ENDOSCOPY;  Service: Gastroenterology;  Laterality: N/A;  Screening  Post op: Normal    ENDOSCOPY N/A 2021    Procedure: ESOPHAGOGASTRODUODENOSCOPY with biopsies;  Surgeon: Timur Taylor MD;  Location: [facility] ENDOSCOPY;  Service: Gastroenterology;  Laterality: N/A;  Pre op: Reflux  Post op: Gastritis and Hiatal Hernia    ESSURE TUBAL LIGATION      FOOT SURGERY Bilateral     KNEE ARTHROSCOPY Left      Social History      Occupational History    Not on file   Tobacco Use    Smoking status: Former     Current packs/day: 0.00     Average packs/day: 0.3 packs/day for 1 year (0.2 ttl pk-yrs)     Types: Cigarettes     Start date: 4/15/2017     Quit date: 3/28/2018     Years since quittin.1     Passive exposure: Past    Smokeless tobacco: Former   Vaping Use    Vaping status: Never Used   Substance and Sexual Activity    Alcohol use: Yes     Comment: Maybe once every two months    Drug use: No    Sexual activity: Not Currently     Partners: Male     Birth control/protection: Condom          Allergies: No Known Allergies    Medications:   Home Medications:  Current Outpatient Medications on File Prior to Visit   Medication Sig    amLODIPine (NORVASC) 5 MG tablet Take 1 tablet by mouth Daily.    Ferrous Sulfate (IRON PO) Take  by mouth.    meclizine (ANTIVERT) 25 MG tablet Take 1 tablet by mouth 3 (Three) Times a Day As Needed for Dizziness.    olmesartan-hydrochlorothiazide (BENICAR HCT) 40-12.5 MG per tablet TAKE 1 TABLET BY MOUTH DAILY    rosuvastatin (CRESTOR) 10 MG tablet Take 1 tablet by mouth Daily.    VITAMIN D PO Take  by mouth.    meloxicam (MOBIC) 15 MG tablet 1 PO Daily with food. (Patient not taking: Reported on 2024)     No current facility-administered medications on file prior to visit.         ROS:  14 point review of systems was negative except as listed in the HPI.    Physical Exam:   53 y.o. female  Body mass index is 34.11 kg/m²., 105 kg (231 lb)  Vitals:    24 1439   Temp: 98.2 °F (36.8 °C)     General: Alert, cooperative, appears well and in no observable distress. Appears stated age and BMI as listed above.  HEENT: Normocephalic, atraumatic on external visual inspection.  CV: No significant peripheral edema.  Respiratory: Normal respiratory effort.  Skin: Warm & well perfused; appropriate skin turgor.  Psych: Appropriate mood & affect.  Neuro: Gross sensation and motor intact in affected  extremity/extremities.  Vascular: Peripheral pulses palpable in affected extremity/extremities.     MSK Exam:    Knee Exam:    Left   No deformity or wounds appreciated. No significant redness or warmth.  Trace effusion noted.  Tenderness along the joint line appreciated medially.  ROM 0-130 with pain at terminal motion.  Ligamentous exam grossly stable.  Jesus Manuel + medial equivocal  [place] +  Quad strength 4-4+/5    Right   No deformity or wounds appreciated. No significant redness or warmth.  No significant effusion noted.  No significant tenderness appreciated about the joint.  ROM 0-130 and painless.  Ligamentous exam grossly stable.  Quad strength 4+ to 5/5.    Brief hip exam in the affected extremity(ies) grossly unremarkable.  Moves ankle and toes up and down, no significant pain or swelling in the foot, ankle or calf.       Radiology:    The following X-rays were ordered/reviewed today to evaluate the patient's symptoms: Single Knee: AP standing and sunrise views of both knees, and lateral view of painful knee show She has some mild degenerative changes particularly in the left knee, the left knee appears to be a little more valgus than the right.  She maintains moderate amount of joint space a little more narrowed in both the medial and lateral compartments compared with the left knee.  She has some patellofemoral changes noted as well on the lateral and sunrise view. There are no prior films available for direct comparison.    Procedure:   N/A    Misc. Data/Labs: N/A    Assessment & Plan:    ICD-10-CM ICD-9-CM   1. Chronic pain of left knee  M25.562 719.46    G89.29 338.29   2. Knee effusion, left  M25.462 719.06   3. Mechanical knee pain, left  M25.562 719.46     No orders of the defined types were placed in this encounter.    Orders Placed This Encounter   Procedures    MRI Knee Left Without Contrast     Pleasant 54 y/o lady who continues to have recurrent left knee pain and swelling. I agree that  she has only mild degenerative changes on her xray- not clearly a good explanation for such significant pain and swelling. I have some concerns this could be meniscal pathology. She has done all of the usual conservative measures PT, ice, NSAIDS, resting etc. Since she has had several injections now, I think an MRI is the next step at this point.     Return for follow up after the MRI.    Patient encouraged to call with questions or concerns prior to follow up.  Recommend ICE and/or HEAT PRN as discussed.  Will discuss with attending physician as needed.  Consider additional referrals, work up and/or advanced imaging as indicated or if patient fails to respond to conservative care.        Daniel Brooks, APRN

## 2024-05-24 NOTE — PROGRESS NOTES
Physical Therapy Treatment Note  [facility] [place]   [address] [place] [address]  P: [phone]       F: [phone]      Patient: Jacqueline Mcdowell   : 1970  Treatment Diagnosis:     ICD-10-CM ICD-9-CM   1. Pain, neck  M54.2 723.1   2. Periscapular pain of right shoulder  M25.511 719.41   3. DDD (degenerative disc disease), cervical  M50.30 722.4     Referring practitioner: Steffany España MD  Date of Initial Visit: Type: THERAPY  Noted: 2024  Today's Date: 2024  Patient seen for 5 sessions           Subjective   Patient reports she had some increased pain in her neck yesterday.  Thinks it is partly sleeping postures.      Objective     See Exercise, Manual, and Modality Logs for complete treatment.       Assessment/Plan  Soft tissue was assessed at cervical/UQ. PT noted point tenderness as well as palpable trigger points within the muslce tissue. On this date patient stated that they would like to undergo a dry needling procedure for the soft tissue dysfunction. Patient was educated on the procedure for dry needling and consent waver signed/on file. Patient was informed of the risks, possible adverse effects, along with the benefits of DN. Patient responded positively to DN and manual therapy with improved muscle tone and decreased TTP.  She had improved mobility and flexibility post treatment.      Progress per Plan of Care           Timed:         Manual Therapy:    23     mins  40073;     Therapeutic Exercise:         mins  13575;     Neuromuscular Chance:        mins  76816;    Therapeutic Activity:          mins  45608;     Gait Training:           mins  05486;     Ultrasound:          mins  58194;    Ionto                                  mins  41647  Self Care                            mins  72006  Canalith Repos         mins  58967  Orthotic MGMT/Train         mins  37714    Un-Timed:  Electrical Stimulation:         mins  84429 (  );  Dry Needling:          mins   self-pay;  Dry Needlin     mins   self-pay  Traction          mins  79814      Timed Treatment:   23   mins   Total Treatment:     40   mins        PT SIGNATURE: Annabella Montejo PT     License Number: [id number]  Electronically signed by Annabella Montejo PT, 24, 11:30 AM EDT

## 2024-05-25 LAB
BACTERIA UR CULT: NORMAL
BACTERIA UR CULT: NORMAL

## 2024-05-28 ENCOUNTER — OFFICE VISIT (OUTPATIENT)
Dept: ORTHOPEDIC SURGERY | Facility: CLINIC | Age: 54
End: 2024-05-28
Payer: COMMERCIAL

## 2024-05-28 VITALS — BODY MASS INDEX: 34.21 KG/M2 | TEMPERATURE: 98.2 F | WEIGHT: 231 LBS | HEIGHT: 69 IN

## 2024-05-28 DIAGNOSIS — M25.562 CHRONIC PAIN OF LEFT KNEE: Primary | ICD-10-CM

## 2024-05-28 DIAGNOSIS — M25.562 MECHANICAL KNEE PAIN, LEFT: ICD-10-CM

## 2024-05-28 DIAGNOSIS — M25.462 KNEE EFFUSION, LEFT: ICD-10-CM

## 2024-05-28 DIAGNOSIS — G89.29 CHRONIC PAIN OF LEFT KNEE: Primary | ICD-10-CM

## 2024-05-31 ENCOUNTER — TREATMENT (OUTPATIENT)
Age: 54
End: 2024-05-31
Payer: COMMERCIAL

## 2024-05-31 DIAGNOSIS — M54.2 PAIN, NECK: Primary | ICD-10-CM

## 2024-05-31 DIAGNOSIS — E78.2 MIXED HYPERLIPIDEMIA: ICD-10-CM

## 2024-05-31 DIAGNOSIS — M25.511 PERISCAPULAR PAIN OF RIGHT SHOULDER: ICD-10-CM

## 2024-05-31 DIAGNOSIS — M50.30 DDD (DEGENERATIVE DISC DISEASE), CERVICAL: ICD-10-CM

## 2024-05-31 PROCEDURE — 97110 THERAPEUTIC EXERCISES: CPT | Performed by: PHYSICAL THERAPIST

## 2024-05-31 PROCEDURE — 97140 MANUAL THERAPY 1/> REGIONS: CPT | Performed by: PHYSICAL THERAPIST

## 2024-05-31 RX ORDER — ROSUVASTATIN CALCIUM 10 MG/1
10 TABLET, COATED ORAL DAILY
Qty: 30 TABLET | Refills: 2 | Status: SHIPPED | OUTPATIENT
Start: 2024-05-31

## 2024-05-31 NOTE — PROGRESS NOTES
Physical Therapy Treatment Note  [facility] [place]   [address] [place] [address]  P: [phone]       F: [phone]      Patient: Jacqueline Mcdowell   : 1970  Treatment Diagnosis:     ICD-10-CM ICD-9-CM   1. Pain, neck  M54.2 723.1   2. Periscapular pain of right shoulder  M25.511 719.41   3. DDD (degenerative disc disease), cervical  M50.30 722.4     Referring practitioner: Steffany España MD  Date of Initial Visit: Type: THERAPY  Noted: 2024  Today's Date: 2024  Patient seen for 6 sessions           Subjective   Patient reports she had some soreness in her muscles after the needling.  States she has not noticed much change in the pain in between her shoulder blades.  States the neck and top of shoulders feel better.      Objective     See Exercise, Manual, and Modality Logs for complete treatment.       Assessment/Plan  Patient presents with improved C/T junction mobility and improved muscle tone in the cervical/UQ regions.  She responded positively to manual therapy with improved muscle flexibility.  Progressed program and HEP.  Progress per Plan of Care           Timed:         Manual Therapy:    23     mins  58547;     Therapeutic Exercise:    12     mins  61590;     Neuromuscular Chance:        mins  24422;    Therapeutic Activity:          mins  18514;     Gait Training:           mins  69004;     Ultrasound:          mins  33027;    Ionto                                  mins  05975  Self Care                            mins  86623  Canalith Repos         mins  43527  Orthotic MGMT/Train         mins  41595    Un-Timed:  Electrical Stimulation:         mins  95049 ( );  Dry Needling:          mins  54306 self-pay;  Dry Needling:          mins  26590 self-pay  Traction          mins  92483      Timed Treatment:   35   mins   Total Treatment:     35   mins        PT SIGNATURE: Annabella Montejo PT     License Number: [id number]  Electronically  signed by Annabella Montejo, PT, 05/31/24, 11:31 AM EDT

## 2024-06-07 ENCOUNTER — TREATMENT (OUTPATIENT)
Age: 54
End: 2024-06-07
Payer: COMMERCIAL

## 2024-06-07 DIAGNOSIS — M54.2 PAIN, NECK: Primary | ICD-10-CM

## 2024-06-07 DIAGNOSIS — M25.511 PERISCAPULAR PAIN OF RIGHT SHOULDER: ICD-10-CM

## 2024-06-07 DIAGNOSIS — M50.30 DDD (DEGENERATIVE DISC DISEASE), CERVICAL: ICD-10-CM

## 2024-06-07 NOTE — PROGRESS NOTES
Physical Therapy Treatment Note  [facility] [place]   [address] [place] [address]  P: [phone]       F: [phone]      Patient: Jacqueline Mcdowell   : 1970  Treatment Diagnosis:     ICD-10-CM ICD-9-CM   1. Pain, neck  M54.2 723.1   2. Periscapular pain of right shoulder  M25.511 719.41   3. DDD (degenerative disc disease), cervical  M50.30 722.4     Referring practitioner: Steffany España MD  Date of Initial Visit: Type: THERAPY  Noted: 2024  Today's Date: 2024  Patient seen for 7 sessions           Subjective   Patient reports she has been having less pain in her neck.     Objective     See Exercise, Manual, and Modality Logs for complete treatment.       Assessment/Plan  Patient performed program with progressed exercises for scapular strengthening/stabilization.  She responded positively to manual therapy with improved mobility and muscle flexibility.  Progressed HEP and provided written instructions for home use.   Progress per Plan of Care and Progress strengthening /stabilization /functional activity           Timed:         Manual Therapy:    23     mins  70648;     Therapeutic Exercise:    15     mins  19369;     Neuromuscular Chance:        mins  17917;    Therapeutic Activity:          mins  36371;     Gait Training:           mins  67736;     Ultrasound:          mins  54818;    Ionto                                  mins  41199  Self Care                            mins  68422  Canalith Repos         mins  58921  Orthotic MGMT/Train         mins  77157    Un-Timed:  Electrical Stimulation:         mins  04792 (MC );  Dry Needling:          mins  45527 self-pay;  Dry Needling:          mins  79803 self-pay  Traction          mins  63800      Timed Treatment:   38   mins   Total Treatment:     38   mins        PT SIGNATURE: Annabella Montejo PT     License Number: [id number]  Electronically signed by Annabella Montejo PT, 24, 11:32 AM  EDT

## 2024-06-14 ENCOUNTER — TREATMENT (OUTPATIENT)
Age: 54
End: 2024-06-14
Payer: COMMERCIAL

## 2024-06-14 ENCOUNTER — TELEPHONE (OUTPATIENT)
Dept: ORTHOPEDIC SURGERY | Facility: CLINIC | Age: 54
End: 2024-06-14
Payer: COMMERCIAL

## 2024-06-14 DIAGNOSIS — M25.511 PERISCAPULAR PAIN OF RIGHT SHOULDER: ICD-10-CM

## 2024-06-14 DIAGNOSIS — M54.2 PAIN, NECK: Primary | ICD-10-CM

## 2024-06-14 DIAGNOSIS — M50.30 DDD (DEGENERATIVE DISC DISEASE), CERVICAL: ICD-10-CM

## 2024-06-14 NOTE — PROGRESS NOTES
Physical Therapy Re-Assessment & Discharge Summary  [facility]   [address] [place] [address]  P: [phone]       F: [phone]        Patient: Jacqueline Mcdowell   : 1970  Visit Diagnoses:     ICD-10-CM ICD-9-CM   1. Pain, neck  M54.2 723.1   2. Periscapular pain of right shoulder  M25.511 719.41   3. DDD (degenerative disc disease), cervical  M50.30 722.4     Referring practitioner: Steffany España MD  Date of Initial Visit: Type: THERAPY  Noted: 2024  Today's Date: 2024  Patient seen for 8 sessions      Subjective:   Jacqueline Mcdowell reports:   Subjective Questionnaire: NDI:  Clinical Progress: improved  Home Program Compliance: Yes  Treatment has included: therapeutic exercise, manual therapy, therapeutic activity, and dry needling    Subjective   Patient reports her neck is feeling a lot better.  States she has not really been having pain.  States she started to feel it when she was doing yard/garden work and was lifting rocks-states she did her stretches/exercises and was able to relieve the pain.      Objective     Palpation   No TTP       Active Range of Motion   Cervical/Thoracic Spine   Cervical     WNL     Strength/Myotome Testing      Left Shoulder   Normal muscle strength     Right Shoulder   Normal muscle strength     Left Elbow   Normal strength     Right Elbow   Normal strength     Left Wrist/Hand   Normal wrist strength        Tests   Cervical      Right   Negative Spurling's sign.      See Exercise, Manual, and Modality Logs for complete treatment.     Assessment/Plan  Patient presents with cervical spine ROM and strength WNL and pain free.  She has improved cervical and UQ muscle tone and flexibility.  She still has mild tightness in pectoral muscles.  She is independent in HEP and symptom management.    Progress toward previous goals: All Met    Goal Review  Short Term Goals:  1.  Patient will have increased cervical spine ROM  to WFL.-met  2.  Patient will have left first rib mobility WNL.-met  3.  Patient will have C/T junction mobility WNL.-met  4.  Patient will normalized muscle tone in cervical/UQ muscles.-met        Long Term Goals:  1.  Patient will be independent in performance of HEP.-met  2.  Patient will have improved NDI score of 5/50 or better.-met  3.  Patient will improved cervical/scapular muscle flexibility WFL.-met  4.  Patient will report decreased pain with sitting to perform work tasks with increased comfort.-met                    Recommendations: Discharge to [facility]      PT SIGNATURE: Annabella Montejo PT     License Number: [id number]  Electronically signed by Annabella Montejo PT, 06/14/24, 11:42 AM EDT        Timed:         Manual Therapy:         mins  91245;     Therapeutic Exercise:     8    mins  93716;     Neuromuscular Chance:        mins  67694;    Therapeutic Activity:     20     mins  66522;     Gait Training:           mins  62437;     Ultrasound:          mins  82082;    Ionto                                  mins  36285  Self Care                            mins  23254  Canalith Repos         mins  79316  Orthotic MGMT/Train         mins  02896    Un-Timed:  Electrical Stimulation:         mins  78841 ( );  Dry Needling:          mins  47124 self-pay;  Dry Needling:          mins  40813 self-pay  Traction          mins  57729  Low Eval          mins  33894  Mod Eval          mins  07434  High Eval                            mins  77508    Timed Treatment:   28   mins   Total Treatment:     28   mins

## 2024-06-14 NOTE — TELEPHONE ENCOUNTER
Please tell her that based on the report she has some fraying of the meniscus but mostly the issue seems to be arthritis in all 3 compartments of the knee.  Her options would be injections activity modification and ultimately at some point a knee replacement.

## 2024-06-14 NOTE — TELEPHONE ENCOUNTER
----- Message from [facility] [employer] sent at 6/14/2024 10:03 AM EDT -----  Regarding: MRI results   Contact: [phone]  Tena- can you tell me what the outcome was for the MRI on my knee? Thanks

## 2024-06-17 DIAGNOSIS — R10.12 LEFT UPPER QUADRANT PAIN: ICD-10-CM

## 2024-08-07 NOTE — PATIENT INSTRUCTIONS
Steps to Quit Smoking   Smoking tobacco can be harmful to your health and can affect almost every organ in your body. Smoking puts you, and those around you, at risk for developing many serious chronic diseases. Quitting smoking is difficult, but it is one of the best things that you can do for your health. It is never too late to quit.  WHAT ARE THE BENEFITS OF QUITTING SMOKING?  When you quit smoking, you lower your risk of developing serious diseases and conditions, such as:  · Lung cancer or lung disease, such as COPD.  · Heart disease.  · Stroke.  · Heart attack.  · Infertility.  · Osteoporosis and bone fractures.  Additionally, symptoms such as coughing, wheezing, and shortness of breath may get better when you quit. You may also find that you get sick less often because your body is stronger at fighting off colds and infections. If you are pregnant, quitting smoking can help to reduce your chances of having a baby of low birth weight.  HOW DO I GET READY TO QUIT?  When you decide to quit smoking, create a plan to make sure that you are successful. Before you quit:  · Pick a date to quit. Set a date within the next two weeks to give you time to prepare.  · Write down the reasons why you are quitting. Keep this list in places where you will see it often, such as on your bathroom mirror or in your car or wallet.  · Identify the people, places, things, and activities that make you want to smoke (triggers) and avoid them. Make sure to take these actions:    Throw away all cigarettes at home, at work, and in your car.    Throw away smoking accessories, such as ashtrays and lighters.    Clean your car and make sure to empty the ashtray.    Clean your home, including curtains and carpets.  · Tell your family, friends, and coworkers that you are quitting. Support from your loved ones can make quitting easier.  · Talk with your health care provider about your options for quitting smoking.  · Find out what treatment  "options are covered by your health insurance.  WHAT STRATEGIES CAN I USE TO QUIT SMOKING?   Talk with your healthcare provider about different strategies to quit smoking. Some strategies include:  · Quitting smoking altogether instead of gradually lessening how much you smoke over a period of time. Research shows that quitting \"cold turkey\" is more successful than gradually quitting.  · Attending in-person counseling to help you build problem-solving skills. You are more likely to have success in quitting if you attend several counseling sessions. Even short sessions of 10 minutes can be effective.  · Finding resources and support systems that can help you to quit smoking and remain smoke-free after you quit. These resources are most helpful when you use them often. They can include:    Online chats with a counselor.    Telephone quitlines.    Printed self-help materials.    Support groups or group counseling.    Text messaging programs.    Mobile phone applications.  · Taking medicines to help you quit smoking. (If you are pregnant or breastfeeding, talk with your health care provider first.) Some medicines contain nicotine and some do not. Both types of medicines help with cravings, but the medicines that include nicotine help to relieve withdrawal symptoms. Your health care provider may recommend:    Nicotine patches, gum, or lozenges.    Nicotine inhalers or sprays.    Non-nicotine medicine that is taken by mouth.  Talk with your health care provider about combining strategies, such as taking medicines while you are also receiving in-person counseling. Using these two strategies together makes you more likely to succeed in quitting than if you used either strategy on its own.  If you are pregnant or breastfeeding, talk with your health care provider about finding counseling or other support strategies to quit smoking. Do not take medicine to help you quit smoking unless told to do so by your health care " provider.  WHAT THINGS CAN I DO TO MAKE IT EASIER TO QUIT?  Quitting smoking might feel overwhelming at first, but there is a lot that you can do to make it easier. Take these important actions:  · Reach out to your family and friends and ask that they support and encourage you during this time. Call telephone quitlines, reach out to support groups, or work with a counselor for support.  · Ask people who smoke to avoid smoking around you.  · Avoid places that trigger you to smoke, such as bars, parties, or smoke-break areas at work.  · Spend time around people who do not smoke.  · Lessen stress in your life, because stress can be a smoking trigger for some people. To lessen stress, try:    Exercising regularly.    Deep-breathing exercises.    Yoga.    Meditating.    Performing a body scan. This involves closing your eyes, scanning your body from head to toe, and noticing which parts of your body are particularly tense. Purposefully relax the muscles in those areas.  · Download or purchase mobile phone or tablet apps (applications) that can help you stick to your quit plan by providing reminders, tips, and encouragement. There are many free apps, such as QuitGuide from the CDC (Centers for Disease Control and Prevention). You can find other support for quitting smoking (smoking cessation) through smokefree.gov and other websites.  HOW WILL I FEEL WHEN I QUIT SMOKING?  Within the first 24 hours of quitting smoking, you may start to feel some withdrawal symptoms. These symptoms are usually most noticeable 2-3 days after quitting, but they usually do not last beyond 2-3 weeks. Changes or symptoms that you might experience include:  · Mood swings.  · Restlessness, anxiety, or irritation.  · Difficulty concentrating.  · Dizziness.  · Strong cravings for sugary foods in addition to nicotine.  · Mild weight gain.  · Constipation.  · Nausea.  · Coughing or a sore throat.  · Changes in how your medicines work in your  body.  · A depressed mood.  · Difficulty sleeping (insomnia).  After the first 2-3 weeks of quitting, you may start to notice more positive results, such as:  · Improved sense of smell and taste.  · Decreased coughing and sore throat.  · Slower heart rate.  · Lower blood pressure.  · Clearer skin.  · The ability to breathe more easily.  · Fewer sick days.  Quitting smoking is very challenging for most people. Do not get discouraged if you are not successful the first time. Some people need to make many attempts to quit before they achieve long-term success. Do your best to stick to your quit plan, and talk with your health care provider if you have any questions or concerns.     This information is not intended to replace advice given to you by your health care provider. Make sure you discuss any questions you have with your health care provider.     Document Released: 12/12/2002 Document Revised: 05/03/2016 Document Reviewed: 05/03/2016  [employer] Interactive Patient Education ©2017 [employer]     [FreeTextEntry1] : #Recurrent concussions c/b migraines will refer to headache specialist in preparation for upcoming academic year  currently feels well with no focal symptoms  #HLD  lifestyle modification discussed at length repeat lipid profile   #elevated CR  suspect related to recent creatine supplement - daily 5 mg  will d/c creatine and repeat to ensure CR returns to baseline     I spent a total of 30 minutes on the date of this encounter that EXCLUDES time spent on AWV, preventive exam, depression screening, tobacco cessation, lung cancer screening and behavioral counseling.  This additional time included: Preparing to see the patient (e.g., review of test results) Obtaining and/or reviewing separately obtained history Performing a medically appropriate exam and/or evaluation Counseling and educating the patient/family/caregiver Ordering medications, tests, procedures Referring and communicating with other healthcare professionals, when not separately reported Documenting clinical information in the health record Care coordination not separately reported

## 2024-08-16 DIAGNOSIS — E78.2 MIXED HYPERLIPIDEMIA: ICD-10-CM

## 2024-08-16 RX ORDER — ROSUVASTATIN CALCIUM 10 MG/1
10 TABLET, COATED ORAL DAILY
Qty: 90 TABLET | Refills: 1 | Status: SHIPPED | OUTPATIENT
Start: 2024-08-16

## 2024-11-05 NOTE — TELEPHONE ENCOUNTER
Gi referral follow up from dr ramon with MRCP results.      Last office visit: 7/20/23    Next office visit: none scheduled    Last refill: 12/6/23

## 2024-11-22 ENCOUNTER — APPOINTMENT (OUTPATIENT)
Dept: WOMENS IMAGING | Facility: HOSPITAL | Age: 54
End: 2024-11-22
Payer: COMMERCIAL

## 2024-11-22 PROCEDURE — 77063 BREAST TOMOSYNTHESIS BI: CPT | Performed by: RADIOLOGY

## 2024-11-22 PROCEDURE — 77067 SCR MAMMO BI INCL CAD: CPT | Performed by: RADIOLOGY

## 2024-12-03 ENCOUNTER — OFFICE VISIT (OUTPATIENT)
Dept: FAMILY MEDICINE CLINIC | Facility: CLINIC | Age: 54
End: 2024-12-03
Payer: COMMERCIAL

## 2024-12-03 VITALS
RESPIRATION RATE: 18 BRPM | BODY MASS INDEX: 32.58 KG/M2 | OXYGEN SATURATION: 100 % | SYSTOLIC BLOOD PRESSURE: 134 MMHG | HEART RATE: 90 BPM | DIASTOLIC BLOOD PRESSURE: 84 MMHG | WEIGHT: 220 LBS | HEIGHT: 69 IN

## 2024-12-03 DIAGNOSIS — R10.12 LEFT UPPER QUADRANT PAIN: Primary | ICD-10-CM

## 2024-12-03 PROCEDURE — 99213 OFFICE O/P EST LOW 20 MIN: CPT | Performed by: NURSE PRACTITIONER

## 2024-12-03 NOTE — PROGRESS NOTES
Subjective   Jacqueline Mcdowell is a 54 y.o. female.     Chief Complaint   Patient presents with    Abdominal Pain     Since may        History of Present Illness     History of Present Illness  The patient presents for evaluation of left-sided abdominal pain.    She continues to experience discomfort on the left side of her abdomen, which she suspects may be nerve-related or possibly shingles without a rash. She has a history of shingles, which caused prolonged pain before the appearance of a rash. The pain is localized in the left upper quadrant of her abdomen and is described as a burning sensation that intensifies upon touch. She describes the pain as more of an uncomfortable feeling than severe pain. Additionally, she describes the pain as a pinching sensation that fades when she stands up. The pain seems to lessen when she is standing or active, and she spends most of her day sitting. Pain is worse with sitting.     She is also interested in discussing the results of her CT scan from 06/14/2024. Results were normal with the exception of a small hiatal hernia and hemangioma at T9.     She has not consulted a gastroenterologist recently. Her last colorectal cancer screening was in 2021, but she does not recall the results. She reports normal bowel movements and no constipation. She has been monitoring her stools for any abnormalities. She reports no nausea, vomiting, or diarrhea. I located the results while patient was in the room and reviewed these with her. Colonoscopy was normal and follow-up was for 10 years.     FAMILY HISTORY  Her mother had diverticulosis.       The following portions of the patient's history were reviewed and updated as appropriate: allergies, current medications, past family history, past medical history, past social history, past surgical history and problem list.    Review of Systems   Constitutional:  Negative for appetite change, chills, fatigue and fever.   Respiratory:  Negative for  cough and shortness of breath.    Cardiovascular:  Negative for chest pain, palpitations and leg swelling.   Gastrointestinal:  Positive for abdominal pain. Negative for abdominal distention, anal bleeding, blood in stool, constipation, diarrhea, nausea, rectal pain, vomiting, GERD and indigestion.   Genitourinary: Negative.  Negative for flank pain.   Neurological:  Negative for dizziness and headache.       Objective   Physical Exam  Vitals and nursing note reviewed.   Constitutional:       Appearance: She is well-developed.   HENT:      Head: Normocephalic and atraumatic.   Eyes:      Conjunctiva/sclera: Conjunctivae normal.      Pupils: Pupils are equal, round, and reactive to light.   Neck:      Thyroid: No thyromegaly.   Cardiovascular:      Rate and Rhythm: Normal rate and regular rhythm.      Heart sounds: Normal heart sounds. No murmur heard.  Pulmonary:      Effort: Pulmonary effort is normal.      Breath sounds: Normal breath sounds.   Abdominal:      General: Abdomen is flat. Bowel sounds are normal. There is no distension.      Palpations: Abdomen is soft. There is no mass.      Tenderness: There is no abdominal tenderness. There is no guarding or rebound.      Hernia: No hernia is present.       Musculoskeletal:      Cervical back: Normal range of motion and neck supple.   Lymphadenopathy:      Cervical: No cervical adenopathy.   Skin:     General: Skin is warm and dry.   Neurological:      Mental Status: She is alert and oriented to person, place, and time.   Psychiatric:         Behavior: Behavior normal.         Thought Content: Thought content normal.         Judgment: Judgment normal.         Vitals:    12/03/24 1512   BP: 134/84   Pulse: 90   Resp: 18   SpO2: 100%     Body mass index is 32.49 kg/m².      Procedures    Assessment & Plan   Problems Addressed this Visit    None  Visit Diagnoses       Left upper quadrant pain    -  Primary    Relevant Orders    Ambulatory Referral to Gastroenterology           Diagnoses         Codes Comments    Left upper quadrant pain    -  Primary ICD-10-CM: R10.12  ICD-9-CM: 789.02             Assessment & Plan  1. Left-sided abdominal pain.  The CT scan revealed a small hiatal hernia and a presumed hemangioma at T9, but no other abnormalities were detected. The small bowel is not dilated, there are no enlarged lymph nodes, and the abdominal aorta is of normal caliber. The colonoscopy results were also normal, with no signs of diverticulosis. The spleen, gallbladder, pancreas, and kidneys all appeared normal on the CT scan. The pain is described as a burning sensation that moves and is better when standing or being active. There is no associated nausea, vomiting, constipation or diarrhea. A referral to a gastroenterologist, Dr. Elvis Diamond, will be made for further evaluation. If the pain intensifies, she is advised to seek immediate medical attention at the ER.         Assessment & Plan  Left upper quadrant pain    Orders:    Ambulatory Referral to Gastroenterology           Return if symptoms worsen or fail to improve.    Patient or patient representative verbalized consent for the use of Ambient Listening during the visit with  VIET Gant for chart documentation. 12/3/2024  15:26 EST

## 2025-02-07 ENCOUNTER — OFFICE VISIT (OUTPATIENT)
Dept: GASTROENTEROLOGY | Facility: CLINIC | Age: 55
End: 2025-02-07
Payer: COMMERCIAL

## 2025-02-07 VITALS
BODY MASS INDEX: 33.36 KG/M2 | SYSTOLIC BLOOD PRESSURE: 114 MMHG | DIASTOLIC BLOOD PRESSURE: 77 MMHG | TEMPERATURE: 96.7 F | WEIGHT: 225.2 LBS | HEART RATE: 71 BPM | HEIGHT: 69 IN

## 2025-02-07 DIAGNOSIS — R10.10 PAIN OF UPPER ABDOMEN: Primary | ICD-10-CM

## 2025-02-07 NOTE — PROGRESS NOTES
Chief Complaint   Patient presents with    Abdominal Pain       HPI    Jacqueline Mcdowell is a  54 y.o. female here to establish care as a new patient for complaints of abdominal pain.    This patient will also follow with Dr. Pack.    Past medical history of hyperlipidemia, hypertension and eczema.    On visit today patient reports roughly 6 months maybe longer of upper abdominal pain seems to localize to the left upper quadrant more of a nuisance than anything.  Comes and goes throughout the week.  Severity varies.  She has rare heartburn.  No nausea, vomiting, dysphagia or odynophagia.  Her appetite is good.  Her weight is stable.    Bowels move every 2 or 3 days.  Reports occasional incomplete evacuation.  No rectal bleeding or rectal pain.  No family history of colon cancer.    She was on an oral iron supplement roughly 1 year ago but stopped in November.  She is following with OB/GYN for uterine fibroids causing heavy menses.    UPPER GI ENDOSCOPY (06/18/2021 09:50) - 2 cm hiatal hernia.  Chronic gastritis.  Normal duodenum.    COLONOSCOPY (06/18/2021 09:50) - normal findings.  Recall 10 years.    Past Medical History:   Diagnosis Date    Acid reflux     Eczema 07/01/2020    Hyperlipidemia     Hypertension        Past Surgical History:   Procedure Laterality Date    BREAST BIOPSY Bilateral     COLONOSCOPY      COLONOSCOPY N/A 6/18/2021    Procedure: COLONOSCOPY into cecum and terminal ileum;  Surgeon: Timur Taylor MD;  Location: [facility] ENDOSCOPY;  Service: Gastroenterology;  Laterality: N/A;  Screening  Post op: Normal    ENDOSCOPY N/A 6/18/2021    Procedure: ESOPHAGOGASTRODUODENOSCOPY with biopsies;  Surgeon: Timur Taylor MD;  Location: [facility] ENDOSCOPY;  Service: Gastroenterology;  Laterality: N/A;  Pre op: Reflux  Post op: Gastritis and Hiatal Hernia    ESSURE TUBAL LIGATION      FOOT SURGERY Bilateral     KNEE ARTHROSCOPY Left        Scheduled Meds:     Continuous Infusions: No current  facility-administered medications for this visit.      PRN Meds:     No Known Allergies    Social History     Socioeconomic History    Marital status: Single   Tobacco Use    Smoking status: Former     Current packs/day: 0.00     Average packs/day: 0.3 packs/day for 1 year (0.2 ttl pk-yrs)     Types: Cigarettes     Start date: 4/15/2017     Quit date: 3/28/2018     Years since quittin.8     Passive exposure: Past    Smokeless tobacco: Former   Vaping Use    Vaping status: Never Used   Substance and Sexual Activity    Alcohol use: Yes     Comment: Maybe once every two months    Drug use: No    Sexual activity: Not Currently     Partners: Male     Birth control/protection: Condom       Family History   Problem Relation Age of Onset    Diabetes Mother     Hypertension Mother     Hyperlipidemia Mother     Cancer Father     Heart disease Father     Breast cancer Maternal Grandmother     Malig Hyperthermia Neg Hx        Review of Systems   Gastrointestinal:  Positive for abdominal pain.       Vitals:    25 1322   BP: 114/77   Pulse: 71   Temp: 96.7 °F (35.9 °C)       Physical Exam  Constitutional:       Appearance: She is well-developed.   Abdominal:      General: Bowel sounds are normal. There is no distension.      Palpations: Abdomen is soft. There is no mass.      Tenderness: There is no abdominal tenderness. There is no guarding.      Hernia: No hernia is present.   Skin:     General: Skin is warm and dry.      Capillary Refill: Capillary refill takes less than 2 seconds.   Neurological:      Mental Status: She is alert and oriented to person, place, and time.   Psychiatric:         Behavior: Behavior normal.     Assessment    Diagnoses and all orders for this visit:    1. Pain of upper abdomen (Primary)  -     CBC (No Diff)  -     Comprehensive Metabolic Panel  -     Celiac Comprehensive Panel  -     FL Upper GI Double Contrast; Future    Plan    Schedule upper GI series for further evaluation of  symptoms  Labs today as above  Trial fiber supplement discussed options and benefits with the patient  Further recommendations and follow-up pending imaging         VIET Silverio  [facility]  [address] [place] [address]  Office: [phone]

## 2025-02-08 LAB
ALBUMIN SERPL-MCNC: 4 G/DL (ref 3.5–5.2)
ALBUMIN/GLOB SERPL: 1.1 G/DL
ALP SERPL-CCNC: 93 U/L (ref 39–117)
ALT SERPL-CCNC: 19 U/L (ref 1–33)
AST SERPL-CCNC: 22 U/L (ref 1–32)
BILIRUB SERPL-MCNC: <0.2 MG/DL (ref 0–1.2)
BUN SERPL-MCNC: 8 MG/DL (ref 6–20)
BUN/CREAT SERPL: 12.3 (ref 7–25)
CALCIUM SERPL-MCNC: 9.1 MG/DL (ref 8.6–10.5)
CHLORIDE SERPL-SCNC: 98 MMOL/L (ref 98–107)
CO2 SERPL-SCNC: 25.8 MMOL/L (ref 22–29)
CREAT SERPL-MCNC: 0.65 MG/DL (ref 0.57–1)
EGFRCR SERPLBLD CKD-EPI 2021: 105.4 ML/MIN/1.73
ERYTHROCYTE [DISTWIDTH] IN BLOOD BY AUTOMATED COUNT: 14 % (ref 12.3–15.4)
GLOBULIN SER CALC-MCNC: 3.8 GM/DL
GLUCOSE SERPL-MCNC: 114 MG/DL (ref 65–99)
HCT VFR BLD AUTO: 35.3 % (ref 34–46.6)
HGB BLD-MCNC: 11.5 G/DL (ref 12–15.9)
MCH RBC QN AUTO: 27.6 PG (ref 26.6–33)
MCHC RBC AUTO-ENTMCNC: 32.6 G/DL (ref 31.5–35.7)
MCV RBC AUTO: 84.7 FL (ref 79–97)
PLATELET # BLD AUTO: 360 10*3/MM3 (ref 140–450)
POTASSIUM SERPL-SCNC: 3.6 MMOL/L (ref 3.5–5.2)
PROT SERPL-MCNC: 7.8 G/DL (ref 6–8.5)
RBC # BLD AUTO: 4.17 10*6/MM3 (ref 3.77–5.28)
SODIUM SERPL-SCNC: 137 MMOL/L (ref 136–145)
WBC # BLD AUTO: 4.84 10*3/MM3 (ref 3.4–10.8)

## 2025-02-10 LAB
ENDOMYSIUM IGA SER QL: NEGATIVE
GLIADIN PEPTIDE IGA SER-ACNC: 3 UNITS (ref 0–19)
GLIADIN PEPTIDE IGG SER-ACNC: 5 UNITS (ref 0–19)
IGA SERPL-MCNC: 136 MG/DL (ref 87–352)
TTG IGA SER-ACNC: <2 U/ML (ref 0–3)
TTG IGG SER-ACNC: <2 U/ML (ref 0–5)

## 2025-02-28 ENCOUNTER — HOSPITAL ENCOUNTER (OUTPATIENT)
Dept: GENERAL RADIOLOGY | Facility: HOSPITAL | Age: 55
Discharge: HOME | End: 2025-02-28
Admitting: NURSE PRACTITIONER
Payer: COMMERCIAL

## 2025-02-28 DIAGNOSIS — R10.10 PAIN OF UPPER ABDOMEN: ICD-10-CM

## 2025-02-28 PROCEDURE — 74246 X-RAY XM UPR GI TRC 2CNTRST: CPT

## 2025-02-28 RX ADMIN — BARIUM SULFATE 183 ML: 960 POWDER, FOR SUSPENSION ORAL at 09:15

## 2025-02-28 RX ADMIN — BARIUM SULFATE 700 MG: 700 TABLET ORAL at 09:15

## 2025-02-28 RX ADMIN — BARIUM SULFATE 135 ML: 980 POWDER, FOR SUSPENSION ORAL at 09:16

## 2025-02-28 RX ADMIN — ANTACID/ANTIFLATULENT 1 PACKET: 380; 550; 10; 10 GRANULE, EFFERVESCENT ORAL at 09:16

## 2025-03-04 ENCOUNTER — TELEPHONE (OUTPATIENT)
Dept: GASTROENTEROLOGY | Facility: CLINIC | Age: 55
End: 2025-03-04
Payer: COMMERCIAL

## 2025-03-04 NOTE — TELEPHONE ENCOUNTER
Called pt and advised of [place] SAENZ's note.  Pt verbalized understanding.    Pt states she is still having the same sx.  Pain in abdomen on left side,  Denies reflux type sx, fever, n/v dysphagia.  Regular BM, mostly in am.  Has been taking Metamucil as recommended.     Update sent to LETTY Be.

## 2025-03-04 NOTE — TELEPHONE ENCOUNTER
----- Message from Indiana Garay sent at 3/3/2025  4:12 PM EST -----  Please inform the patient imaging shows a small amount of reflux.  Please check on symptoms.

## 2025-03-05 DIAGNOSIS — R10.10 PAIN OF UPPER ABDOMEN: Primary | ICD-10-CM

## 2025-03-05 DIAGNOSIS — R12 HEARTBURN: ICD-10-CM

## 2025-03-05 NOTE — TELEPHONE ENCOUNTER
Please call patient to schedule EGD with Dr. Pack for further evaluation of symptoms.  Thanks [place]

## 2025-03-06 NOTE — TELEPHONE ENCOUNTER
Called pt and advised of reason for EGD.  Pt states she will think about it and call back to schedule.

## 2025-03-10 ENCOUNTER — TELEPHONE (OUTPATIENT)
Dept: GASTROENTEROLOGY | Facility: CLINIC | Age: 55
End: 2025-03-10
Payer: COMMERCIAL

## 2025-03-10 NOTE — TELEPHONE ENCOUNTER
Hub staff attempted to follow warm transfer process and was unsuccessful     Caller: Jacqueline Mcdowell    Relationship to patient: Self    Best call back number: [phone]      Patient is needing: PT RETURNING PHONE CALL TO SCHEDULE SCOPE. PLS CALL PT BACK.

## 2025-04-03 DIAGNOSIS — Z00.00 ANNUAL PHYSICAL EXAM: ICD-10-CM

## 2025-04-03 DIAGNOSIS — I10 ESSENTIAL HYPERTENSION: ICD-10-CM

## 2025-04-03 RX ORDER — OLMESARTAN MEDOXOMIL AND HYDROCHLOROTHIAZIDE 40/12.5 40; 12.5 MG/1; MG/1
1 TABLET ORAL DAILY
Qty: 30 TABLET | Refills: 3 | Status: SHIPPED | OUTPATIENT
Start: 2025-04-03

## 2025-04-04 DIAGNOSIS — E78.2 MIXED HYPERLIPIDEMIA: ICD-10-CM

## 2025-04-04 RX ORDER — ROSUVASTATIN CALCIUM 10 MG/1
10 TABLET, COATED ORAL DAILY
Qty: 90 TABLET | Refills: 3 | Status: SHIPPED | OUTPATIENT
Start: 2025-04-04

## 2025-05-28 DIAGNOSIS — I10 ESSENTIAL HYPERTENSION: ICD-10-CM

## 2025-05-28 DIAGNOSIS — Z00.00 ANNUAL PHYSICAL EXAM: ICD-10-CM

## 2025-05-29 RX ORDER — OLMESARTAN MEDOXOMIL AND HYDROCHLOROTHIAZIDE 40/12.5 40; 12.5 MG/1; MG/1
1 TABLET ORAL DAILY
Qty: 90 TABLET | Refills: 3 | Status: SHIPPED | OUTPATIENT
Start: 2025-05-29

## 2025-06-09 DIAGNOSIS — I10 ESSENTIAL HYPERTENSION: ICD-10-CM

## 2025-06-09 RX ORDER — AMLODIPINE BESYLATE 5 MG/1
5 TABLET ORAL DAILY
Qty: 90 TABLET | Refills: 0 | Status: SHIPPED | OUTPATIENT
Start: 2025-06-09 | End: 2025-06-09 | Stop reason: SDUPTHER

## 2025-06-09 RX ORDER — AMLODIPINE BESYLATE 5 MG/1
5 TABLET ORAL DAILY
Qty: 90 TABLET | Refills: 3 | Status: SHIPPED | OUTPATIENT
Start: 2025-06-09

## 2025-06-12 ENCOUNTER — ANESTHESIA (OUTPATIENT)
Dept: GASTROENTEROLOGY | Facility: HOSPITAL | Age: 55
End: 2025-06-12
Payer: COMMERCIAL

## 2025-06-12 ENCOUNTER — ANESTHESIA EVENT (OUTPATIENT)
Dept: GASTROENTEROLOGY | Facility: HOSPITAL | Age: 55
End: 2025-06-12
Payer: COMMERCIAL

## 2025-06-12 ENCOUNTER — HOSPITAL ENCOUNTER (OUTPATIENT)
Facility: HOSPITAL | Age: 55
Setting detail: HOSPITAL OUTPATIENT SURGERY
Discharge: HOME | End: 2025-06-12
Attending: INTERNAL MEDICINE | Admitting: INTERNAL MEDICINE
Payer: COMMERCIAL

## 2025-06-12 VITALS
OXYGEN SATURATION: 98 % | HEART RATE: 81 BPM | HEIGHT: 68 IN | RESPIRATION RATE: 23 BRPM | WEIGHT: 224 LBS | BODY MASS INDEX: 33.95 KG/M2 | DIASTOLIC BLOOD PRESSURE: 90 MMHG | SYSTOLIC BLOOD PRESSURE: 148 MMHG

## 2025-06-12 DIAGNOSIS — R12 HEARTBURN: ICD-10-CM

## 2025-06-12 DIAGNOSIS — R10.10 PAIN OF UPPER ABDOMEN: ICD-10-CM

## 2025-06-12 PROCEDURE — 43239 EGD BIOPSY SINGLE/MULTIPLE: CPT | Performed by: INTERNAL MEDICINE

## 2025-06-12 PROCEDURE — 25010000002 GLYCOPYRROLATE 0.2 MG/ML SOLUTION: Performed by: NURSE ANESTHETIST, CERTIFIED REGISTERED

## 2025-06-12 PROCEDURE — 25010000002 PROPOFOL 10 MG/ML EMULSION: Performed by: NURSE ANESTHETIST, CERTIFIED REGISTERED

## 2025-06-12 PROCEDURE — 88305 TISSUE EXAM BY PATHOLOGIST: CPT | Performed by: INTERNAL MEDICINE

## 2025-06-12 PROCEDURE — 25810000003 LACTATED RINGERS PER 1000 ML: Performed by: INTERNAL MEDICINE

## 2025-06-12 PROCEDURE — 25010000002 LIDOCAINE PF 2% 2 % SOLUTION: Performed by: NURSE ANESTHETIST, CERTIFIED REGISTERED

## 2025-06-12 RX ORDER — PROPOFOL 10 MG/ML
VIAL (ML) INTRAVENOUS AS NEEDED
Status: DISCONTINUED | OUTPATIENT
Start: 2025-06-12 | End: 2025-06-12 | Stop reason: SURG

## 2025-06-12 RX ORDER — GLYCOPYRROLATE 0.2 MG/ML
INJECTION INTRAMUSCULAR; INTRAVENOUS AS NEEDED
Status: DISCONTINUED | OUTPATIENT
Start: 2025-06-12 | End: 2025-06-12 | Stop reason: SURG

## 2025-06-12 RX ORDER — SODIUM CHLORIDE, SODIUM LACTATE, POTASSIUM CHLORIDE, CALCIUM CHLORIDE 600; 310; 30; 20 MG/100ML; MG/100ML; MG/100ML; MG/100ML
30 INJECTION, SOLUTION INTRAVENOUS CONTINUOUS PRN
Status: DISCONTINUED | OUTPATIENT
Start: 2025-06-12 | End: 2025-06-12 | Stop reason: HOSPADM

## 2025-06-12 RX ORDER — LIDOCAINE HYDROCHLORIDE 20 MG/ML
INJECTION, SOLUTION EPIDURAL; INFILTRATION; INTRACAUDAL; PERINEURAL AS NEEDED
Status: DISCONTINUED | OUTPATIENT
Start: 2025-06-12 | End: 2025-06-12 | Stop reason: SURG

## 2025-06-12 RX ADMIN — LIDOCAINE HYDROCHLORIDE 100 MG: 20 INJECTION, SOLUTION EPIDURAL; INFILTRATION; INTRACAUDAL; PERINEURAL at 10:38

## 2025-06-12 RX ADMIN — PROPOFOL 180 MCG/KG/MIN: 10 INJECTION, EMULSION INTRAVENOUS at 10:38

## 2025-06-12 RX ADMIN — SODIUM CHLORIDE, POTASSIUM CHLORIDE, SODIUM LACTATE AND CALCIUM CHLORIDE: 600; 310; 30; 20 INJECTION, SOLUTION INTRAVENOUS at 10:32

## 2025-06-12 RX ADMIN — PROPOFOL 40 MG: 10 INJECTION, EMULSION INTRAVENOUS at 10:40

## 2025-06-12 RX ADMIN — PROPOFOL 100 MG: 10 INJECTION, EMULSION INTRAVENOUS at 10:38

## 2025-06-12 RX ADMIN — GLYCOPYRROLATE 0.2 MG: 0.2 INJECTION INTRAMUSCULAR; INTRAVENOUS at 10:33

## 2025-06-12 NOTE — DISCHARGE INSTRUCTIONS
For the next 24 hours patient needs to be with a responsible adult.    For THE REST OF TODAY DO NOT drive, operate machinery, appliances, drink alcohol, make important decisions or sign legal documents.    Start with a light or bland diet if you are feeling sick to your stomach otherwise advance to regular diet as tolerated.    Follow recommendations on procedure report if provided by your doctor.    Call Dr Pack for problems [phone]    Problems may include but not limited to: large amounts of bleeding, trouble breathing, repeated vomiting, severe unrelieved pain, fever or chills.      If biopsies or polyps were taken, MD will call you with the results in about 7 days. If you don't hear from the MD in 2 weeks, call the number above.

## 2025-06-12 NOTE — ANESTHESIA POSTPROCEDURE EVALUATION
Patient: Jacqueline Mcdowell    Procedure Summary       Date: 06/12/25 Room / Location: [facility] / [facility]    Anesthesia Start: 1032 Anesthesia Stop: 1048    Procedure: ESOPHAGOGASTRODUODENOSCOPY WITH BIOPSIES (Esophagus) Diagnosis:       Pain of upper abdomen      Heartburn      (Pain of upper abdomen [R10.10])      (Heartburn [R12])    Surgeons: Elvis Pack MD Provider: Quoc Pedraza MD    Anesthesia Type: MAC ASA Status: 3            Anesthesia Type: MAC    Vitals  Vitals Value Taken Time   /90 06/12/25 11:14   Temp     Pulse 81 06/12/25 11:15   Resp 23 06/12/25 11:13   SpO2 92 % 06/12/25 11:14   Vitals shown include unfiled device data.        Post Anesthesia Care and Evaluation    Patient location during evaluation: PACU  Patient participation: complete - patient participated  Level of consciousness: awake and alert  Pain management: adequate    Airway patency: patent  Anesthetic complications: No anesthetic complications    Cardiovascular status: acceptable  Respiratory status: acceptable  Hydration status: acceptable    Comments: --------------------            06/12/25               1113     --------------------   BP:       148/90     Pulse:      81       Resp:       23       SpO2:      98%      --------------------

## 2025-06-12 NOTE — ANESTHESIA PREPROCEDURE EVALUATION
Anesthesia Evaluation     Patient summary reviewed and Nursing notes reviewed                Airway   Mallampati: II  Dental      Pulmonary    (+) a smoker Former,  Cardiovascular     ECG reviewed  Rhythm: regular  Rate: normal    (+) hypertension, hyperlipidemia      Neuro/Psych- negative ROS  GI/Hepatic/Renal/Endo    (+) obesity, GERD    Musculoskeletal (-) negative ROS    Abdominal    Substance History   (+) alcohol use     OB/GYN negative ob/gyn ROS         Other                    Anesthesia Plan    ASA 3     MAC     intravenous induction     Anesthetic plan, risks, benefits, and alternatives have been provided, discussed and informed consent has been obtained with: patient.    CODE STATUS:

## 2025-06-15 NOTE — H&P
[facility]  Pre Procedure History & Physical    Chief Complaint:   Abdominal pain    Subjective     HPI:   54 y.o. female here for EGD for c/o abdominal pain    Past Medical History:   Past Medical History:   Diagnosis Date    Acid reflux     Chronic abdominal pain     Eczema 07/01/2020    Hyperlipidemia     Hypertension        Past Surgical History:  Past Surgical History:   Procedure Laterality Date    BREAST BIOPSY Bilateral     COLONOSCOPY      COLONOSCOPY N/A 06/18/2021    Procedure: COLONOSCOPY into cecum and terminal ileum;  Surgeon: Tiumr Taylor MD;  Location:  [facility];  Service: Gastroenterology;  Laterality: N/A;  Screening  Post op: Normal    ENDOSCOPY N/A 06/18/2021    Procedure: ESOPHAGOGASTRODUODENOSCOPY with biopsies;  Surgeon: Timur Taylor MD;  Location:  [facility];  Service: Gastroenterology;  Laterality: N/A;  Pre op: Reflux  Post op: Gastritis and Hiatal Hernia    ENDOSCOPY N/A 6/12/2025    Procedure: ESOPHAGOGASTRODUODENOSCOPY WITH BIOPSIES;  Surgeon: Elvis Pack MD;  Location:  [facility];  Service: Gastroenterology;  Laterality: N/A;  PRE- UPPPER ABDOMIN PAIN, HEARTBURN  POST- SMALL HIATAL HERNIA    ESSURE TUBAL LIGATION      FOOT SURGERY Bilateral     KNEE ARTHROSCOPY Left        Family History:  Family History   Problem Relation Age of Onset    Diabetes Mother     Hypertension Mother     Hyperlipidemia Mother     Cancer Father     Heart disease Father     Breast cancer Maternal Grandmother     Malig Hyperthermia Neg Hx        Social History:   reports that she quit smoking about 7 years ago. Her smoking use included cigarettes. She started smoking about 8 years ago. She has a 0.2 pack-year smoking history. She has been exposed to tobacco smoke. She has quit using smokeless tobacco. She reports current alcohol use. She reports that she does not use drugs.    Medications:   No medications prior to admission.  "      Allergies:  Patient has no known allergies.    ROS:    Pertinent items are noted in HPI     Objective     Blood pressure 148/90, pulse 81, resp. rate 23, height 172.7 cm (68\"), weight 102 kg (224 lb), last menstrual period 06/02/2025, SpO2 98%, not currently breastfeeding.    Physical Exam   Constitutional: Pt is oriented to person, place, and time and well-developed, well-nourished, and in no distress.   Mouth/Throat: Oropharynx is clear and moist.   Neck: Normal range of motion.   Cardiovascular: Normal rate, regular rhythm and normal heart sounds.    Pulmonary/Chest: Effort normal and breath sounds normal.   Abdominal: Soft. Nontender  Skin: Skin is warm and dry.   Psychiatric: Mood, memory, affect and judgment normal.     Assessment & Plan     Diagnosis:  Abdominal pain     Anticipated Surgical Procedure:  EGD    The risks, benefits, and alternatives of this procedure have been discussed with the patient or the responsible party- the patient understands and agrees to proceed.                                                            "

## 2025-08-11 DIAGNOSIS — I10 ESSENTIAL HYPERTENSION: ICD-10-CM

## 2025-08-11 RX ORDER — AMLODIPINE BESYLATE 5 MG/1
5 TABLET ORAL DAILY
Qty: 30 TABLET | Refills: 0 | Status: SHIPPED | OUTPATIENT
Start: 2025-08-11

## (undated) DEVICE — TUBING, SUCTION, 1/4" X 10', STRAIGHT: Brand: MEDLINE

## (undated) DEVICE — CANN O2 ETCO2 FITS ALL CONN CO2 SMPL A/ 7IN DISP LF

## (undated) DEVICE — KT ORCA ORCAPOD DISP STRL

## (undated) DEVICE — THE TORRENT IRRIGATION SCOPE CONNECTOR IS USED WITH THE TORRENT IRRIGATION TUBING TO PROVIDE IRRIGATION FLUIDS SUCH AS STERILE WATER DURING GASTROINTESTINAL ENDOSCOPIC PROCEDURES WHEN USED IN CONJUNCTION WITH AN IRRIGATION PUMP (OR ELECTROSURGICAL UNIT).: Brand: TORRENT

## (undated) DEVICE — FRCP BX RADJAW4 NDL 2.8 240CM LG OG BX40

## (undated) DEVICE — SENSR O2 OXIMAX FNGR A/ 18IN NONSTR

## (undated) DEVICE — ADAPT CLN BIOGUARD AIR/H2O DISP

## (undated) DEVICE — LN SMPL CO2 SHTRM SD STREAM W/M LUER

## (undated) DEVICE — BLCK/BITE BLOX W/DENTL/RIM W/STRAP 54F

## (undated) DEVICE — SINGLE-USE BIOPSY FORCEPS: Brand: RADIAL JAW 4

## (undated) DEVICE — MSK PROC CURAPLEX O2 2/ADAPT 7FT

## (undated) DEVICE — BITEBLOCK OMNI BLOC